# Patient Record
Sex: MALE | Race: WHITE | NOT HISPANIC OR LATINO | Employment: OTHER | URBAN - METROPOLITAN AREA
[De-identification: names, ages, dates, MRNs, and addresses within clinical notes are randomized per-mention and may not be internally consistent; named-entity substitution may affect disease eponyms.]

---

## 2023-05-18 ENCOUNTER — HOSPITAL ENCOUNTER (EMERGENCY)
Facility: CLINIC | Age: 68
Discharge: HOME | End: 2023-05-18
Attending: PHYSICIAN ASSISTANT | Admitting: PHYSICIAN ASSISTANT
Payer: COMMERCIAL

## 2023-05-18 ENCOUNTER — APPOINTMENT (OUTPATIENT)
Dept: GENERAL RADIOLOGY | Facility: CLINIC | Age: 68
End: 2023-05-18
Attending: PHYSICIAN ASSISTANT
Payer: COMMERCIAL

## 2023-05-18 VITALS
WEIGHT: 225 LBS | HEART RATE: 70 BPM | TEMPERATURE: 97.3 F | OXYGEN SATURATION: 100 % | SYSTOLIC BLOOD PRESSURE: 140 MMHG | HEIGHT: 75 IN | RESPIRATION RATE: 20 BRPM | BODY MASS INDEX: 27.98 KG/M2 | DIASTOLIC BLOOD PRESSURE: 88 MMHG

## 2023-05-18 DIAGNOSIS — S76.111A QUADRICEPS TENDON RUPTURE, RIGHT, INITIAL ENCOUNTER: ICD-10-CM

## 2023-05-18 PROCEDURE — 73562 X-RAY EXAM OF KNEE 3: CPT | Mod: RT

## 2023-05-18 PROCEDURE — 73552 X-RAY EXAM OF FEMUR 2/>: CPT | Mod: RT

## 2023-05-18 PROCEDURE — 99284 EMERGENCY DEPT VISIT MOD MDM: CPT

## 2023-05-18 ASSESSMENT — ACTIVITIES OF DAILY LIVING (ADL)
ADLS_ACUITY_SCORE: 35
ADLS_ACUITY_SCORE: 35

## 2023-05-18 ASSESSMENT — ENCOUNTER SYMPTOMS
ARTHRALGIAS: 1
NUMBNESS: 0

## 2023-05-18 NOTE — ED TRIAGE NOTES
Pt was on wing of plane when he slid off, landing on R knee, thigh. Approx fall 2 feet. Unable to bear weight. Did not hit head.      Triage Assessment     Row Name 05/18/23 0670       Triage Assessment (Adult)    Airway WDL WDL       Respiratory WDL    Respiratory WDL WDL       Skin Circulation/Temperature WDL    Skin Circulation/Temperature WDL WDL       Cardiac WDL    Cardiac WDL WDL       Peripheral/Neurovascular WDL    Peripheral Neurovascular WDL WDL       Cognitive/Neuro/Behavioral WDL    Cognitive/Neuro/Behavioral WDL WDL

## 2023-05-18 NOTE — ED PROVIDER NOTES
"  History     Chief Complaint:  Fall and Leg Pain       HPI   Brandon Dye is a 67 year old male with a history of hyperlipidemia who presents with leg pain after a fall. 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 He was cleaning when he slipped and fell. He states that his lower right leg went behind him as he slid off the wing. He then hit the concrete after about a 2 foot drop on his right knee. He had pain in the right upper anterior leg right after. He was able to stand up but not walk as he cannot extend his knee.  No other injuries he denies any numbness in the lower leg or foot.      Independent Historian:   None - Patient Only    Review of External Notes: None    ROS:  Review of Systems   Musculoskeletal: Positive for arthralgias.   Neurological: Negative for numbness.   All other systems reviewed and are negative.      Allergies:  No known allergies     Medications:    Crestor    Past Medical History:    Hyperlipidemia    Social History:  The patient presents to the ED alone.    Physical Exam     Patient Vitals for the past 24 hrs:   BP Temp Temp src Pulse Resp SpO2 Height Weight   05/18/23 1629 (!) 140/88 97.3  F (36.3  C) Temporal 70 20 100 % 1.905 m (6' 3\") 102.1 kg (225 lb)        Physical Exam  General: Alert and oriented.    Head: Normocephalic.  External ears and nose normal.    Eyes: Pupils equal and round.  Normal tracking.    Pulmonary/Chest: Effort and rate normal.     MSK: No bony tenderness to the femur, patella, tibia or fibula.  No overt bruising or swelling.  The patient is unable to perform extension at the knee and has tenderness along the quadriceps tendon no tenderness along the patellar tendon.  No focal ttp over lateral process of tibial plateau.  No gross ligamentous laxity.  Normal movement at the hip and ankle.  No pain with axial loading of the hip or passive ROM of the hip.  Compartments soft, no pain w/passive ROM of ankle or great " toe.    SKIN:  Warm and dry with strong DP,PT pulse and normal capillary refill. No rashes or crepitance.    NEURO:  Normal sensation throughout the foot and ankle. Normal strength of plantar and dorsiflexion in ankle and toes.    PSYCH:  Normal affect      Emergency Department Course     Imaging:  XR Femur Right 2 Views   Preliminary Result   IMPRESSION: Mild osteoarthrosis and chondrocalcinosis in the right hip and knee. The femur appears normal. No fracture.         XR Knee Right 3 Views   Preliminary Result   IMPRESSION: Mild tricompartmental osteoarthrosis in the knee. There is no evidence of fracture. Extensive chondrocalcinosis in the medial and lateral menisci. No fracture, effusion, or calcified intra-articular body.            Report per radiology    Emergency Department Course & Assessments:    Assessments:  1640 Obtained the patients history and performed initial exam  1733 Rechecked the patient and updated patient on findings    Independent Interpretation (X-rays, CTs, rhythm strip):  Independently reviewed the x-rays no evidence of fracture or dislocation.  No knee joint effusion seen.    Social Determinants of Health affecting care:   None    Disposition:  The patient was discharged to home.     Impression & Plan      Medical Decision Makin year old male presents with right knee and anterior thigh pain after fall.  Patient history and records reviewed.  Broad differential was considered.  Patient is well appearing with stable vitals.  X-rays were obtained which demonstrates no evidence of fracture or dislocation.  Examination of joint above and below does not suggest referred pain and do not feel radiographs of these joints are indicated at this time.  Neurovascular status is intact distally and no signs of compartment syndrome.    Patient's is unable to perform extension at the knee and has tenderness along the anterior right thigh over the quadriceps tendon.  Suspect quadriceps tendon  tear/rupture.  Attempted to order ultrasound to confirm however unfortunately staff ultrasound and radiology unable to perform today.  Patient placed in knee immobilizer given crutches nonweightbearing will need close orthopedic follow-up in the next several days as I discussed with surgical repair likely indicated within 1 week to avoid long-term disability poor healing.  He expressed understanding of this.  No evidence of occult knee dislocation.  Nothing to suggest occult tibial plateau fracture.  He will call tomorrow to get in to be seen soon as possible and regardless will follow-up in Ortho clinic or Ortho urgent care within 72 hours for further eval.  He expressed understanding of this.  Return precautions given for signs of CMS compromise etc.        Diagnosis:    ICD-10-CM    1. Quadriceps tendon rupture, right, initial encounter  S76.111A     suspected           Scribe Disclosure:  I, Hung Washburn, am serving as a scribe at 4:33 PM on 5/18/2023 to document services personally performed by Garcia Fleming PA-C based on my observations and the provider's statements to me.     5/18/2023   Garcia Fleming PA-C Etten, Clark Ellsworth, PA-C  05/18/23 1844

## 2023-05-18 NOTE — DISCHARGE INSTRUCTIONS
I suspect that you suffered a quadriceps tendon rupture.  This is a tear in the extensor tendon that allows you to straighten your knee out.  You will need close follow-up with orthopedics as this will likely require urgent surgical repair within the next week.  Call tomorrow to schedule appointment with them for further evaluation.  If you are unable to be seen within the next 2 to 3 days I recommend going to [facility] walk-in for further evaluation.    Discharge Instructions  Extremity Injury    You were seen today for an injury to an extremity (arm, hand, leg, or foot). You may have a bruise, strain, or fracture (broken bone).    Generally, every Emergency Department visit should have a follow-up clinic visit with either a primary or a specialty clinic/provider. Please follow-up as instructed by your emergency provider today.  Return to the Emergency Department right away if:  Your pain seems to change or get worse or there is pain in a new area that wasn t evaluated today.  Your extremity becomes pale, cool, blue, or numb or tingling past the injury.  You have more drainage, redness or pain in the area of the cut or abrasion.  You have pain that you cannot control with the medicine recommended or prescribed here, or you have pain that seems too much for your injury.  Your child (who is injured) will not stop crying or is much more fussy than normal.  You have new symptoms or anything that worries you.    What to Expect:  Your swelling and pain may be worse the day after your injury, but should not be severe and should start getting better after that. You should not have new symptoms and your pain should not get worse.  You may start to get a bruise over the injured area or below the injured area (bruising can follow gravity).  Your movement and strength should get better with time.  Some injuries may not show up until after you have left the Emergency Department so it is important to follow-up as  directed.  Your injury may prevent you from working.  Follow-up with your regular provider to get a work release note.  Pain medications or your injury may make it unsafe to drive or operate machinery.    Home Care:  RICE: Rest, Ice, Compression, Elevation  Rest: Rest your injured area for at least 1-2 days. After that you may start using your extremity again as long as there is not too much pain.   Ice: Apply ice your injured area for 15 minutes at a time, at least 3 times a day. Use a cloth between the ice bag and your skin to prevent frostbite. Do not sleep with an ice pack or heating pad on, since this can cause burns or skin injury.  Compression: You may use an elastic bandage (Ace  Wrap) if it makes you more comfortable. Wrap it just tight enough to provide light compression, like a new pair of socks feels. Loosen the bandage if you have swelling past the bandage.  Elevation: Raise the injured area above the level of your heart as much as possible in the first 1-2 days.    Use Tylenol  (acetaminophen), Motrin (ibuprofen), or Advil  (ibuprofen) for your pain unless you have an allergy or are told not to use these medications by your provider.  Take the medications as instructed on the package. Tylenol  (acetaminophen) is in many prescription medicines and non-prescription medicines--check all of your medicines to be sure you aren t taking more than 3000 mg per day.  Please follow any other instructions that were discussed with you by your provider.    Stretching/Exercises:  You may have been provided with instructions for stretching or exercises. If your injury was to your arm or shoulder and your provider put you in a sling or an immobilizer, it is important that you take off your immobilizer within 3 days and stretch/move your shoulder, unless your provider specifically tells you to not move your shoulder.  This is to prevent further injury such as a  frozen shoulder .     If you were given a prescription for  medicine here today, be sure to read all of the information (including the package insert) that comes with your prescription.  This will include important information about the medicine, its side effects, and any warnings that you need to know about.  The pharmacist who fills the prescription can provide more information and answer questions you may have about the medicine.  If you have questions or concerns that the pharmacist cannot address, please call or return to the Emergency Department.     Remember that you can always come back to the Emergency Department if you are not able to see your regular provider in the amount of time listed above, if you get any new symptoms, or if there is anything that worries you.

## 2023-05-23 ENCOUNTER — OFFICE VISIT (OUTPATIENT)
Dept: FAMILY MEDICINE | Facility: CLINIC | Age: 68
End: 2023-05-23

## 2023-05-23 VITALS
DIASTOLIC BLOOD PRESSURE: 86 MMHG | WEIGHT: 225 LBS | SYSTOLIC BLOOD PRESSURE: 132 MMHG | HEIGHT: 75 IN | HEART RATE: 75 BPM | TEMPERATURE: 97.8 F | OXYGEN SATURATION: 98 % | BODY MASS INDEX: 27.98 KG/M2

## 2023-05-23 DIAGNOSIS — S76.111A QUADRICEPS TENDON RUPTURE, RIGHT, INITIAL ENCOUNTER: Primary | ICD-10-CM

## 2023-05-23 DIAGNOSIS — Z76.89 HEALTH CARE HOME: ICD-10-CM

## 2023-05-23 DIAGNOSIS — Z71.89 ACP (ADVANCE CARE PLANNING): ICD-10-CM

## 2023-05-23 DIAGNOSIS — Z01.818 PRE-OP EXAM: ICD-10-CM

## 2023-05-23 LAB
% GRANULOCYTES: 54.4 %
BUN SERPL-MCNC: 23 MG/DL (ref 7–25)
BUN/CREATININE RATIO: 22.5 (ref 6–32)
CALCIUM SERPL-MCNC: 9.5 MG/DL (ref 8.6–10.3)
CHLORIDE SERPLBLD-SCNC: 104.5 MMOL/L (ref 98–110)
CO2 SERPL-SCNC: 27.7 MMOL/L (ref 20–32)
CREAT SERPL-MCNC: 1.02 MG/DL (ref 0.6–1.3)
GLUCOSE SERPL-MCNC: 90 MG/DL (ref 60–99)
HCT VFR BLD AUTO: 44.6 % (ref 40–53)
HEMOGLOBIN: 14.3 G/DL (ref 13.3–17.7)
LYMPHOCYTES NFR BLD AUTO: 37.3 %
MCH RBC QN AUTO: 30.2 PG (ref 26–33)
MCHC RBC AUTO-ENTMCNC: 32.1 G/DL (ref 31–36)
MCV RBC AUTO: 94 FL (ref 78–100)
MONOCYTES NFR BLD AUTO: 8.3 %
PLATELET COUNT - QUEST: 191 10^9/L (ref 150–375)
POTASSIUM SERPL-SCNC: 4.37 MMOL/L (ref 3.5–5.3)
RBC # BLD AUTO: 4.74 10*12/L (ref 4.4–5.9)
SODIUM SERPL-SCNC: 139 MMOL/L (ref 135–146)
WBC # BLD AUTO: 8.7 10*9/L (ref 4–11)

## 2023-05-23 PROCEDURE — 85025 COMPLETE CBC W/AUTO DIFF WBC: CPT | Performed by: PHYSICIAN ASSISTANT

## 2023-05-23 PROCEDURE — 80048 BASIC METABOLIC PNL TOTAL CA: CPT | Performed by: PHYSICIAN ASSISTANT

## 2023-05-23 PROCEDURE — 99204 OFFICE O/P NEW MOD 45 MIN: CPT | Performed by: PHYSICIAN ASSISTANT

## 2023-05-23 PROCEDURE — 36415 COLL VENOUS BLD VENIPUNCTURE: CPT | Performed by: PHYSICIAN ASSISTANT

## 2023-05-23 PROCEDURE — 93000 ELECTROCARDIOGRAM COMPLETE: CPT | Performed by: PHYSICIAN ASSISTANT

## 2023-05-23 NOTE — PROGRESS NOTES
[facility]  [address] [place] [address]  [address]  [facility] [address]  Phone: [phone]  Fax: [phone]  Primary Provider: Nikunj Verduzco  Pre-op Performing Provider: NIKUNJ VERDUZCO      PREOPERATIVE EVALUATION:  Today's date: 2023    Brandon Dye is a 67 year old male who presents for a preoperative evaluation. ( [birth year])    Surgical Information:  Surgery/Procedure: Right knee repair  Surgery Location: [facility] [place]   Surgeon: Dr. Rodrigez  Surgery Date: 23  Time of Surgery: PM  Where patient plans to recover: At home with family  Fax number for surgical facility: [phone]    Assessment & Plan     The proposed surgical procedure is considered INTERMEDIATE risk.    Health Care Home      ACP (advance care planning)      Quadriceps tendon rupture, right, initial encounter  Proceed with surgery at surgeon's discretion.    - VENOUS COLLECTION  - HEMOGRAM PLATELET DIFF (BFP)  - Basic Metabolic Panel (BFP)  - EKG 12-lead complete w/read - Clinics    Pre-op exam    - VENOUS COLLECTION  - HEMOGRAM PLATELET DIFF (BFP)  - Basic Metabolic Panel (BFP)  - EKG 12-lead complete w/read - Clinics             - No identified additional risk factors other than previously addressed    Antiplatelet or Anticoagulation Medication Instructions:   - Patient is on no antiplatelet or anticoagulation medications.    Additional Medication Instructions:  Patient is on no additional chronic medications    RECOMMENDATION:  APPROVAL GIVEN to proceed with proposed procedure, without further diagnostic evaluation.      Subjective       HPI related to upcoming procedure: Fell off personal airplane onto concrete    1. No - Have you ever had a heart attack or stroke?  2. No - Have you ever had surgery on your heart or blood vessels, such as a stent, coronary (heart) bypass, or surgery on an artery in the head, neck, heart, or legs?  3. No - Do you have chest pain when you are physically active?  4. No - Do  you have a history of heart failure?  5. No - Do you currently have a cold, bronchitis, or symptoms of other respiratory (head and chest) infections?  6. No - Do you have a cough, shortness of breath, or wheezing?  7. No - Do you or anyone in your family have a history of blood clots?  8. No - Do you or anyone in your family have a serious bleeding problem, such as long-lasting bleeding after surgeries or cuts?  9. No - Have you ever had anemia or been told to take iron pills?  10. No - Have you had any abnormal blood loss such as black, tarry or bloody stools, or abnormal vaginal bleeding?  11. No - Have you ever had a blood transfusion?  12. Yes - Are you willing to have a blood transfusion if it is medically needed before, during, or after your surgery?  13. No - Have you or anyone in your family ever had problems with anesthesia (sedation for surgery)?  14. No - Do you have sleep apnea, excessive snoring, or daytime drowsiness?   15. No - Do you have any artifical heart valves or other implanted medical devices, such as a pacemaker, defibrillator, or continuous glucose monitor?  16. No - Do you have any artifical joints?  17. No - Are you allergic to latex?    Health Care Directive:  Patient does not have a Health Care Directive or Living Will: Discussed advance care planning with patient; information given to patient to review.    Preoperative Review of :   reviewed - no record of controlled substances prescribed.      Status of Chronic Conditions:  See problem list for active medical problems.  Problems all longstanding and stable, except as noted/documented.  See ROS for pertinent symptoms related to these conditions.      Review of Systems  CONSTITUTIONAL: NEGATIVE for fever, chills, change in weight  INTEGUMENTARY/SKIN: NEGATIVE for worrisome rashes, moles or lesions  EYES: NEGATIVE for vision changes or irritation  ENT/MOUTH: NEGATIVE for ear, mouth and throat problems  RESP: NEGATIVE for significant  "cough or SOB  CV: NEGATIVE for chest pain, palpitations or peripheral edema  GI: NEGATIVE for nausea, abdominal pain, heartburn, or change in bowel habits  : NEGATIVE for frequency, dysuria, or hematuria  NEURO: NEGATIVE for weakness, dizziness or paresthesias  ENDOCRINE: NEGATIVE for temperature intolerance, skin/hair changes  HEME: NEGATIVE for bleeding problems  PSYCHIATRIC: NEGATIVE for changes in mood or affect    Patient Active Problem List    Diagnosis Date Noted     Health Care Home 05/23/2023     Priority: Low     ACP (advance care planning) 05/23/2023     Priority: Low      History reviewed. No pertinent past medical history.  History reviewed. No pertinent surgical history.  No current outpatient medications on file.       No Known Allergies     Social History     Tobacco Use     Smoking status: Never     Passive exposure: Never     Smokeless tobacco: Never   Vaping Use     Vaping status: Not on file   Substance Use Topics     Alcohol use: Yes     Alcohol/week: 1.0 standard drink of alcohol     Types: 1 Standard drinks or equivalent per week       History   Drug Use Unknown         Objective     /86 (BP Location: Right arm, Patient Position: Sitting, Cuff Size: Adult Large)   Pulse 75   Temp 97.8  F (36.6  C) (Temporal)   Ht 1.905 m (6' 3\")   Wt 102.1 kg (225 lb)   SpO2 98%   BMI 28.12 kg/m      Physical Exam    GENERAL APPEARANCE: healthy, alert and no distress     EYES: EOMI,  PERRL     HENT: ear canals and TM's normal and nose and mouth without ulcers or lesions     NECK: no adenopathy, no asymmetry, masses, or scars and thyroid normal to palpation     RESP: lungs clear to auscultation - no rales, rhonchi or wheezes     CV: regular rates and rhythm, normal S1 S2, no S3 or S4 and no murmur, click or rub     ABDOMEN:  soft, nontender, no HSM or masses and bowel sounds normal     SKIN: no suspicious lesions or rashes     NEURO: Normal strength and tone, sensory exam grossly normal, mentation " intact and speech normal     PSYCH: mentation appears normal. and affect normal/bright     LYMPHATICS: No cervical adenopathy    No results for input(s): HGB, PLT, INR, NA, POTASSIUM, CR, A1C in the last 91267 hours.     Diagnostics:  Recent Results (from the past 24 hour(s))   HEMOGRAM PLATELET DIFF (BFP)    Collection Time: 05/23/23 12:00 AM   Result Value Ref Range    WBC 8.7 4.0 - 11 10*9/L    RBC Count 4.74 4.4 - 5.9 10*12/L    Hemoglobin 14.3 13.3 - 17.7 g/dL    Hematocrit 44.6 40.0 - 53.0 %    MCV 94.0 78 - 100 fL    MCH 30.2 26 - 33 pg    MCHC 32.1 31 - 36 g/dL    Platelet Count 191 150 - 375 10^9/L    % Granulocytes 54.4 %    % Lymphocytes 37.3 %    % Monocytes 8.3 %   Basic Metabolic Panel (BFP)    Collection Time: 05/23/23  2:20 PM   Result Value Ref Range    Carbon Dioxide 27.7 20 - 32 mmol/L    Creatinine 1.02 0.60 - 1.30 mg/dL    Glucose 90 60 - 99 mg/dL    Sodium 139.0 135 - 146 mmol/L    Potassium 4.37 3.5 - 5.3 mmol/L    Chloride 104.5 98 - 110 mmol/L    Urea Nitrogen 23 7 - 25 mg/dL    Calcium 9.5 8.6 - 10.3 mg/dL    BUN/Creatinine Ratio 22.5 6 - 32      EKG: appears normal, NSR, normal axis, normal intervals, no acute ST/T changes c/w ischemia, no LVH by voltage criteria, unchanged from previous tracings    Revised Cardiac Risk Index (RCRI):  The patient has the following serious cardiovascular risks for perioperative complications:   - Coronary Artery Disease = 1 point   -Known mild CAD per cardiology    RCRI Interpretation: 1 point: Class II (low risk - 0.9% complication rate)           Signed Electronically by: Nikunj Verduzco PA-C  Copy of this evaluation report is provided to requesting physician.

## 2023-05-23 NOTE — PROGRESS NOTES
[facility]  [address]  [address]  [facility] [address]  Phone: [phone]  Fax: [phone]  Primary Provider: Nikunj Verduzco  Pre-op Performing Provider: NIKUNJ VERDUZCO    {Provider  Link to PREOP SmartSet  Use this to apply standard patient instructions to AVS; includes medication directions, common orders, guidelines for anemia, warfarin, additional testing   :611524}  PREOPERATIVE EVALUATION:  Today's date: 5/23/2023    Brandon Dye is a 67 year old male who presents for a preoperative evaluation.    Surgical Information:  Surgery/Procedure: ***  Surgery Location: ***  Surgeon: ***  Surgery Date: ***  Time of Surgery: ***  Where patient plans to recover: {Preop post recovery plans :594134}  Fax number for surgical facility: {SURGERY FAX NUMBER:[phone]}    {2021 Provider Charting Preference for Preop :118082}    Subjective       HPI related to upcoming procedure: ***    {PREOP QUESTIONNAIRE OPTIONS 2 (BY MA):013517}    {Click here to pull in Questionnaire Data after Qnr completed :145842}  Health Care Directive:  Patient does not have a Health Care Directive or Living Will: {ADVANCE_DIRECTIVE_STATUS:176096}    Preoperative Review of :  {Mnpmpreport:521975}  {Review MNPMP for all patients per ICSI MNPMP Profile:621366}    {Chronic problem details (Optional) :225499}    Review of Systems  {ROS Preop Choices:629552}    There are no problems to display for this patient.     No past medical history on file.  No past surgical history on file.  No current outpatient medications on file.       No Known Allergies     Social History     Tobacco Use     Smoking status: Not on file     Smokeless tobacco: Not on file   Substance Use Topics     Alcohol use: Not on file     {FAMILY HISTORY (Optional):346997661}  History   Drug Use Not on file         Objective     There were no vitals taken for this visit.    Physical Exam  {EXAM Preop Choices:606295}    No results for input(s): HGB, PLT, INR,  "NA, POTASSIUM, CR, A1C in the last 05750 hours.     Diagnostics:  {LABS:758796}   {EK}    Revised Cardiac Risk Index (RCRI):  The patient has the following serious cardiovascular risks for perioperative complications:  {PREOP REVISED CARDIAC RISK INDEX (RCRI) :225095::\" - No serious cardiac risks = 0 points\"}     RCRI Interpretation: {REVISED CARDIAC RISK INTERPRETATION :072838}         Signed Electronically by: Nikunj Verduzco PA-C  Copy of this evaluation report is provided to requesting physician.    {Provider Resources  Preop [facility] Preop Guidelines  Revised Cardiac Risk Index :481681}  "

## 2023-05-23 NOTE — NURSING NOTE
Chief Complaint   Patient presents with     Pre-Op Exam     Surgery/Procedure: Right knee repair  Surgery Location: [facility]   Surgeon: Dr. Rodrigez  Surgery Date: 05/24/23     New Patient     New patient to our clinic

## 2023-06-14 ENCOUNTER — DOCUMENTATION ONLY (OUTPATIENT)
Facility: CLINIC | Age: 68
End: 2023-06-14
Payer: COMMERCIAL

## 2023-06-14 DIAGNOSIS — S76.111A STRAIN OF QUADRICEPS TENDON, RIGHT, INITIAL ENCOUNTER: Primary | ICD-10-CM

## 2023-06-16 ENCOUNTER — HOSPITAL ENCOUNTER (INPATIENT)
Facility: CLINIC | Age: 68
LOS: 5 days | Discharge: HOME | DRG: 378 | End: 2023-06-21
Attending: EMERGENCY MEDICINE | Admitting: INTERNAL MEDICINE
Payer: COMMERCIAL

## 2023-06-16 DIAGNOSIS — K92.2 UPPER GI BLEED: ICD-10-CM

## 2023-06-16 DIAGNOSIS — D64.9 SYMPTOMATIC ANEMIA: ICD-10-CM

## 2023-06-16 LAB
ABO/RH(D): NORMAL
ANION GAP SERPL CALCULATED.3IONS-SCNC: 9 MMOL/L (ref 7–15)
ANTIBODY SCREEN: NEGATIVE
ATRIAL RATE - MUSE: 88 BPM
BASOPHILS # BLD AUTO: 0 10E3/UL (ref 0–0.2)
BASOPHILS NFR BLD AUTO: 0 %
BLD PROD TYP BPU: NORMAL
BLOOD COMPONENT TYPE: NORMAL
BUN SERPL-MCNC: 26.7 MG/DL (ref 8–23)
CALCIUM SERPL-MCNC: 8.8 MG/DL (ref 8.8–10.2)
CHLORIDE SERPL-SCNC: 103 MMOL/L (ref 98–107)
CODING SYSTEM: NORMAL
CREAT SERPL-MCNC: 0.95 MG/DL (ref 0.67–1.17)
CROSSMATCH: NORMAL
D DIMER PPP FEU-MCNC: <0.27 UG/ML FEU (ref 0–0.5)
DEPRECATED HCO3 PLAS-SCNC: 24 MMOL/L (ref 22–29)
DIASTOLIC BLOOD PRESSURE - MUSE: NORMAL MMHG
EOSINOPHIL # BLD AUTO: 0.2 10E3/UL (ref 0–0.7)
EOSINOPHIL NFR BLD AUTO: 2 %
ERYTHROCYTE [DISTWIDTH] IN BLOOD BY AUTOMATED COUNT: 15.1 % (ref 10–15)
GFR SERPL CREATININE-BSD FRML MDRD: 87 ML/MIN/1.73M2
GLUCOSE SERPL-MCNC: 133 MG/DL (ref 70–99)
HCT VFR BLD AUTO: 22.1 % (ref 40–53)
HGB BLD-MCNC: 7.2 G/DL (ref 13.3–17.7)
HGB BLD-MCNC: 7.3 G/DL (ref 13.3–17.7)
IMM GRANULOCYTES # BLD: 0 10E3/UL
IMM GRANULOCYTES NFR BLD: 0 %
INR PPP: 1.1 (ref 0.85–1.15)
INTERPRETATION ECG - MUSE: NORMAL
ISSUE DATE AND TIME: NORMAL
LYMPHOCYTES # BLD AUTO: 2.7 10E3/UL (ref 0.8–5.3)
LYMPHOCYTES NFR BLD AUTO: 36 %
MCH RBC QN AUTO: 30.7 PG (ref 26.5–33)
MCHC RBC AUTO-ENTMCNC: 33 G/DL (ref 31.5–36.5)
MCV RBC AUTO: 93 FL (ref 78–100)
MONOCYTES # BLD AUTO: 0.4 10E3/UL (ref 0–1.3)
MONOCYTES NFR BLD AUTO: 6 %
NEUTROPHILS # BLD AUTO: 4.2 10E3/UL (ref 1.6–8.3)
NEUTROPHILS NFR BLD AUTO: 56 %
NRBC # BLD AUTO: 0 10E3/UL
NRBC BLD AUTO-RTO: 0 /100
NT-PROBNP SERPL-MCNC: 99 PG/ML (ref 0–900)
P AXIS - MUSE: 64 DEGREES
PLATELET # BLD AUTO: 198 10E3/UL (ref 150–450)
POTASSIUM SERPL-SCNC: 3.8 MMOL/L (ref 3.4–5.3)
PR INTERVAL - MUSE: 132 MS
QRS DURATION - MUSE: 84 MS
QT - MUSE: 370 MS
QTC - MUSE: 447 MS
R AXIS - MUSE: 56 DEGREES
RBC # BLD AUTO: 2.38 10E6/UL (ref 4.4–5.9)
SODIUM SERPL-SCNC: 136 MMOL/L (ref 136–145)
SPECIMEN EXPIRATION DATE: NORMAL
SYSTOLIC BLOOD PRESSURE - MUSE: NORMAL MMHG
T AXIS - MUSE: 43 DEGREES
TROPONIN T SERPL HS-MCNC: 27 NG/L
TROPONIN T SERPL HS-MCNC: 32 NG/L
UNIT ABO/RH: NORMAL
UNIT NUMBER: NORMAL
UNIT STATUS: NORMAL
UNIT TYPE ISBT: 9500
VENTRICULAR RATE- MUSE: 88 BPM
WBC # BLD AUTO: 7.5 10E3/UL (ref 4–11)

## 2023-06-16 PROCEDURE — 250N000011 HC RX IP 250 OP 636: Performed by: EMERGENCY MEDICINE

## 2023-06-16 PROCEDURE — 85025 COMPLETE CBC W/AUTO DIFF WBC: CPT | Performed by: EMERGENCY MEDICINE

## 2023-06-16 PROCEDURE — 85379 FIBRIN DEGRADATION QUANT: CPT | Performed by: EMERGENCY MEDICINE

## 2023-06-16 PROCEDURE — 80048 BASIC METABOLIC PNL TOTAL CA: CPT | Performed by: EMERGENCY MEDICINE

## 2023-06-16 PROCEDURE — 84484 ASSAY OF TROPONIN QUANT: CPT | Performed by: INTERNAL MEDICINE

## 2023-06-16 PROCEDURE — 250N000011 HC RX IP 250 OP 636: Performed by: INTERNAL MEDICINE

## 2023-06-16 PROCEDURE — 85018 HEMOGLOBIN: CPT | Performed by: INTERNAL MEDICINE

## 2023-06-16 PROCEDURE — C9113 INJ PANTOPRAZOLE SODIUM, VIA: HCPCS | Performed by: EMERGENCY MEDICINE

## 2023-06-16 PROCEDURE — 36415 COLL VENOUS BLD VENIPUNCTURE: CPT | Performed by: EMERGENCY MEDICINE

## 2023-06-16 PROCEDURE — 84484 ASSAY OF TROPONIN QUANT: CPT | Performed by: EMERGENCY MEDICINE

## 2023-06-16 PROCEDURE — 36430 TRANSFUSION BLD/BLD COMPNT: CPT

## 2023-06-16 PROCEDURE — 258N000003 HC RX IP 258 OP 636: Performed by: EMERGENCY MEDICINE

## 2023-06-16 PROCEDURE — 86901 BLOOD TYPING SEROLOGIC RH(D): CPT | Performed by: EMERGENCY MEDICINE

## 2023-06-16 PROCEDURE — 96376 TX/PRO/DX INJ SAME DRUG ADON: CPT

## 2023-06-16 PROCEDURE — 93005 ELECTROCARDIOGRAM TRACING: CPT

## 2023-06-16 PROCEDURE — 258N000003 HC RX IP 258 OP 636: Performed by: INTERNAL MEDICINE

## 2023-06-16 PROCEDURE — 99223 1ST HOSP IP/OBS HIGH 75: CPT | Performed by: INTERNAL MEDICINE

## 2023-06-16 PROCEDURE — 86923 COMPATIBILITY TEST ELECTRIC: CPT | Performed by: HOSPITALIST

## 2023-06-16 PROCEDURE — 36415 COLL VENOUS BLD VENIPUNCTURE: CPT | Performed by: INTERNAL MEDICINE

## 2023-06-16 PROCEDURE — 86923 COMPATIBILITY TEST ELECTRIC: CPT | Performed by: INTERNAL MEDICINE

## 2023-06-16 PROCEDURE — 85610 PROTHROMBIN TIME: CPT | Performed by: EMERGENCY MEDICINE

## 2023-06-16 PROCEDURE — 86850 RBC ANTIBODY SCREEN: CPT | Performed by: EMERGENCY MEDICINE

## 2023-06-16 PROCEDURE — 96374 THER/PROPH/DIAG INJ IV PUSH: CPT

## 2023-06-16 PROCEDURE — 120N000001 HC R&B MED SURG/OB

## 2023-06-16 PROCEDURE — 99285 EMERGENCY DEPT VISIT HI MDM: CPT | Mod: 25

## 2023-06-16 PROCEDURE — 83880 ASSAY OF NATRIURETIC PEPTIDE: CPT | Performed by: EMERGENCY MEDICINE

## 2023-06-16 PROCEDURE — C9113 INJ PANTOPRAZOLE SODIUM, VIA: HCPCS | Performed by: INTERNAL MEDICINE

## 2023-06-16 PROCEDURE — P9016 RBC LEUKOCYTES REDUCED: HCPCS | Performed by: EMERGENCY MEDICINE

## 2023-06-16 PROCEDURE — 86923 COMPATIBILITY TEST ELECTRIC: CPT | Performed by: EMERGENCY MEDICINE

## 2023-06-16 RX ORDER — ASPIRIN 81 MG/1
81 TABLET ORAL EVERY 6 HOURS PRN
Status: ON HOLD | COMMUNITY
End: 2023-06-21

## 2023-06-16 RX ORDER — ACETAMINOPHEN 650 MG/1
650 SUPPOSITORY RECTAL EVERY 6 HOURS PRN
Status: DISCONTINUED | OUTPATIENT
Start: 2023-06-16 | End: 2023-06-19

## 2023-06-16 RX ORDER — ACETAMINOPHEN 325 MG/1
325-650 TABLET ORAL EVERY 6 HOURS PRN
COMMUNITY

## 2023-06-16 RX ORDER — ACETAMINOPHEN 325 MG/1
650 TABLET ORAL EVERY 6 HOURS PRN
Status: DISCONTINUED | OUTPATIENT
Start: 2023-06-16 | End: 2023-06-19

## 2023-06-16 RX ORDER — ONDANSETRON 4 MG/1
4 TABLET, ORALLY DISINTEGRATING ORAL EVERY 6 HOURS PRN
Status: DISCONTINUED | OUTPATIENT
Start: 2023-06-16 | End: 2023-06-21 | Stop reason: HOSPADM

## 2023-06-16 RX ORDER — SODIUM CHLORIDE 9 MG/ML
INJECTION, SOLUTION INTRAVENOUS CONTINUOUS
Status: DISCONTINUED | OUTPATIENT
Start: 2023-06-16 | End: 2023-06-18

## 2023-06-16 RX ORDER — ONDANSETRON 2 MG/ML
4 INJECTION INTRAMUSCULAR; INTRAVENOUS EVERY 6 HOURS PRN
Status: DISCONTINUED | OUTPATIENT
Start: 2023-06-16 | End: 2023-06-21 | Stop reason: HOSPADM

## 2023-06-16 RX ORDER — IBUPROFEN 200 MG
200 TABLET ORAL EVERY 4 HOURS PRN
Status: ON HOLD | COMMUNITY
End: 2023-06-21

## 2023-06-16 RX ADMIN — SODIUM CHLORIDE 100 ML/HR: 9 INJECTION, SOLUTION INTRAVENOUS at 17:40

## 2023-06-16 RX ADMIN — PANTOPRAZOLE SODIUM 80 MG: 40 INJECTION, POWDER, FOR SOLUTION INTRAVENOUS at 13:18

## 2023-06-16 RX ADMIN — SODIUM CHLORIDE 8 MG/HR: 9 INJECTION, SOLUTION INTRAVENOUS at 13:34

## 2023-06-16 RX ADMIN — PANTOPRAZOLE SODIUM 40 MG: 40 INJECTION, POWDER, FOR SOLUTION INTRAVENOUS at 20:55

## 2023-06-16 ASSESSMENT — ACTIVITIES OF DAILY LIVING (ADL)
DIFFICULTY_EATING/SWALLOWING: NO
DOING_ERRANDS_INDEPENDENTLY_DIFFICULTY: NO
NUMBER_OF_TIMES_PATIENT_HAS_FALLEN_WITHIN_LAST_SIX_MONTHS: 1
ADLS_ACUITY_SCORE: 22
ADLS_ACUITY_SCORE: 22
TOILETING_ISSUES: NO
CHANGE_IN_FUNCTIONAL_STATUS_SINCE_ONSET_OF_CURRENT_ILLNESS/INJURY: NO
ADLS_ACUITY_SCORE: 35
FALL_HISTORY_WITHIN_LAST_SIX_MONTHS: YES
ADLS_ACUITY_SCORE: 20
EQUIPMENT_CURRENTLY_USED_AT_HOME: CRUTCHES
ADLS_ACUITY_SCORE: 35
ADLS_ACUITY_SCORE: 35
WEAR_GLASSES_OR_BLIND: YES
CONCENTRATING,_REMEMBERING_OR_MAKING_DECISIONS_DIFFICULTY: NO
DRESSING/BATHING_DIFFICULTY: NO
WALKING_OR_CLIMBING_STAIRS_DIFFICULTY: NO

## 2023-06-16 NOTE — PHARMACY-ADMISSION MEDICATION HISTORY
Pharmacist Admission Medication History    Admission medication history is complete. The information provided in this note is only as accurate as the sources available at the time of the update.    Medication reconciliation/reorder completed by provider prior to medication history? No    Information Source(s): Patient and CareEverywhere/SureScripts via in-person    Pertinent Information: Recently completed ASA 325mg daily for post op ppx and started the baby aspirin    Changes made to PTA medication list:    Added: all meds    Deleted: None    Changed: None    Allergies reviewed with patient and updates made in EHR: yes    Medication History Completed By: Trista Blue RPH 6/16/2023 1:45 PM    Prior to Admission medications    Medication Sig Last Dose Taking? Auth Provider Long Term End Date   acetaminophen (TYLENOL) 325 MG tablet Take 325-650 mg by mouth every 6 hours as needed for mild pain 6/15/2023 at x2 Yes Unknown, Entered By History     aspirin 81 MG EC tablet Take 81 mg by mouth every 6 hours as needed for moderate pain 6/16/2023 at x2 Yes Unknown, Entered By History     ibuprofen (ADVIL/MOTRIN) 200 MG tablet Take 200 mg by mouth every 4 hours as needed for pain  Yes Unknown, Entered By History

## 2023-06-16 NOTE — CONSULTS
[facility]    Gastroenterology Consultation    Date of Admission:  6/16/2023    History of Present Illness   Brandon Dye is a 67 year old male who presents with melena.   He reports he started noticing dark stools around at least a week ago.  He has gradually felt worse since then and as he was feeling quite unwell when up and appearing more pale he presented for evaluation today.  He was noted to have a hgb near 7.  He denies history of GI bleeds.  He has been on ASA and ibuprofen since quad tendon surgery late May.  He also has noticed increase GI upset/reflux type complaints past couple weeks.  Denies any bright read blood or emesis.  Denies prior ulcer hx.  He has had prior colonoscopies with last 4-5 years ago which he states was negative for any major problems (gets them in [place]). Never had endoscopy done before.    Assessment & Plan   Brandon Dye is a 67 year old male who was admitted on 6/16/2023. I was asked to see the patient for melena. w/ recent joint surgery on ASA and NSAID p/w melena w/ acute drop in Hgb compared to 5/2023.   -w/ clinical hx and elevated BUN, upper GI bleeding from PUD until proven otherwise  -IV PPI 80 mg loading followed by 40 mg BID  -NPO after MN, EGD tmr or earlier if vitals instability    78[address] level 2 consult    Principal Problem:    Symptomatic anemia  Active Problems:    Upper GI bleed      Cheyenne Braswell MD  (Man[place])  [facility]  Office: [phone]  Cell: [phone], please feel free to call the cell    Code Status    Full Code      Primary Care Physician   Nikunj Braswell MD  (Manolo)  [facility]  Office: [phone]  Cell: [phone], please feel free to call the cell      Past Medical History   I have reviewed this patient's medical history and updated it with pertinent information if needed.   No past medical history on file.    Past Surgical History   I  have reviewed this patient's surgical history and updated it with pertinent information if needed.  Past Surgical History:   Procedure Laterality Date     COLONOSCOPY N/A 6/18/2023    Procedure: Colonoscopy;  Surgeon: Cheyenne Braswell MD;  Location:  GI     ESOPHAGOSCOPY, GASTROSCOPY, DUODENOSCOPY (EGD), COMBINED N/A 6/17/2023    Procedure: Esophagoscopy, gastroscopy, duodenoscopy (EGD), combined;  Surgeon: Cheyenne Braswell MD;  Location:  GI       Prior to Admission Medications   Prior to Admission Medications   Prescriptions Last Dose Informant Patient Reported? Taking?   acetaminophen (TYLENOL) 325 MG tablet 6/15/2023 at x2 Self Yes Yes   Sig: Take 325-650 mg by mouth every 6 hours as needed for mild pain   aspirin 81 MG EC tablet 6/16/2023 at x2 Self Yes Yes   Sig: Take 81 mg by mouth every 6 hours as needed for moderate pain   ibuprofen (ADVIL/MOTRIN) 200 MG tablet  Self Yes Yes   Sig: Take 200 mg by mouth every 4 hours as needed for pain      Facility-Administered Medications: None     Allergies   No Known Allergies    Social History   I have reviewed this patient's social history and updated it with pertinent information if needed. Brandon Dye  reports that he has never smoked. He has never been exposed to tobacco smoke. He has never used smokeless tobacco. He reports current alcohol use of about 1.0 standard drink of alcohol per week. He reports that he does not use drugs.    Family History   I have reviewed this patient's family history and updated it with pertinent information if needed.   No family history on file.    Review of Systems   The 10 point Review of Systems is negative other than noted in the HPI or here.     Physical Exam   Temp: 98.2  F (36.8  C) Temp src: Oral BP: 125/75 Pulse: 78   Resp: 10 SpO2: 99 % O2 Device: None (Room air)    Vital Signs with Ranges  Temp:  [97.8  F (36.6  C)-98.5  F (36.9  C)] 98.2  F (36.8  C)  Pulse:  [69-91] 78  Resp:  [9-31] 10  BP: (105-138)/(68-87)  125/75  SpO2:  [96 %-100 %] 99 %  225 lbs 0 oz    Exam:  Constitutional: healthy, alert and no distress  Head: Normocephalic. No masses, lesions, tenderness or abnormalities  Neck: Neck supple. No adenopathy. Thyroid symmetric, normal size,, Carotids without bruits.  ENT: ENT exam normal, no neck nodes or sinus tenderness  Cardiovascular: negative, PMI normal. No lifts, heaves, or thrills. RRR. No murmurs, clicks gallops or rub  Respiratory: negative, Percussion normal. Good diaphragmatic excursion. Lungs clear  Gastrointestinal: Abdomen soft, non-tender. BS normal. No masses, organomegaly  : Deferred  Musculoskeletal: extremities normal- no gross deformities noted, gait normal and normal muscle tone  Skin: no suspicious lesions or rashes  Neurologic: Gait normal. Reflexes normal and symmetric. Sensation grossly WNL.  Psychiatric: mentation appears normal and affect normal/bright  Hematologic/Lymphatic/Immunologic: Normal cervical lymph nodes     Data   Results for orders placed or performed during the hospital encounter of 06/16/23 (from the past 24 hour(s))   CBC with platelets   Result Value Ref Range    WBC Count 7.8 4.0 - 11.0 10e3/uL    RBC Count 2.46 (L) 4.40 - 5.90 10e6/uL    Hemoglobin 7.5 (L) 13.3 - 17.7 g/dL    Hematocrit 22.8 (L) 40.0 - 53.0 %    MCV 93 78 - 100 fL    MCH 30.5 26.5 - 33.0 pg    MCHC 32.9 31.5 - 36.5 g/dL    RDW 15.9 (H) 10.0 - 15.0 %    Platelet Count 157 150 - 450 10e3/uL   Hemoglobin   Result Value Ref Range    Hemoglobin 7.4 (L) 13.3 - 17.7 g/dL   COLONOSCOPY   Result Value Ref Range    COLONOSCOPY       [facility]  [address]  [id number]  _______________________________________________________________________________  Patient Name: Brandon Dye           Procedure Date: 6/18/2023 8:47 AM  MRN: [id number]                       Account Number: [id number]  YOB: 1955              Admit Type: Inpatient  Age: 67                                Room: 2  Note Status: Finalized                Attending MD: BILLY NORWOOD MD,   Instrument Name: 524 [id number] Adult Colon   _______________________________________________________________________________     Procedure:                Colonoscopy  Indications:              Melena, Acute post hemorrhagic anemia  Providers:                BILLY NORWOOD MD, Arlyn Howe, YU  Referring MD:               Medicines:                Midazolam 4 mg IV, Fentanyl 150 micrograms IV  Complications:            No immediate complications.  _______________________________________________________________________________   Procedure:                Pre-Anesthesia Assessment:                            - Prior to the procedure, a History and Physical                             was performed, and patient medications and                             allergies were reviewed. The patient is competent.                             The risks and benefits of the procedure and the                             sedation options and risks were discussed with the                             patient. All questions were answered and informed                             consent was obtained. Patient identification and                             proposed procedure were verified by the physician.                             Mental Status Examination: normal. Respiratory                             Examination: clear to auscultation. Prophylactic                             Antibiotics: The patient does not require                             prophylactic antibiotics. Prior Anticoagulants: The                             pa tient has taken no anticoagulant or antiplatelet                             agents. ASA Grade Assessment: I - A normal, healthy                             patient. After reviewing the risks and benefits,                             the patient was deemed in satisfactory condition to                             undergo the  procedure. The anesthesia plan was to                             use moderate sedation / analgesia (conscious                             sedation). Immediately prior to administration of                             medications, the patient was re-assessed for                             adequacy to receive sedatives. The heart rate,                             respiratory rate, oxygen saturations, blood                             pressure, adequacy of pulmonary ventilation, and                             response to care were monitored throughout the                             procedure. The physical status of the patient was                             re-asse ssed after the procedure.                            After obtaining informed consent, the colonoscope                             was passed under direct vision. Throughout the                             procedure, the patient's blood pressure, pulse, and                             oxygen saturations were monitored continuously. The                             colonoscope 524 was introduced through the anus and                             advanced to 10 cm into the ileum. The colonoscopy                             was performed without difficulty. The patient                             tolerated the procedure well. The quality of the                             bowel preparation was fair.                                                                                   Findings:       The perianal and digital rectal examinations were normal.       Hematin (altered blood/coffee-ground-like material) was found in the        entire colon.       The terminal ileum appeared normal.        Scattered small-mouthed diverticula were found in the sigmoid colon.       Internal hemorrhoids were found during retroflexion. The hemorrhoids        were small.                                                                                   Impression:                - Preparation of the colon was fair.                            - Blood in the entire examined colon.                            - The examined portion of the ileum was normal.                            - Diverticulosis in the sigmoid colon.                            - Internal hemorrhoids.                            - No specimens collected.                            - Melena from TI and in entire colon. TI was                             intubated for 10 cm with melena seen hence bleeding                             very unlikely from colon and could be small bowel                             bleeding.  Recommendation:           - Return patient to [facility] for [facility] care.                            - Res ume regular diet.                            - Recommend capsule endoscopy for small bowel                             evaluation to finish anemia work up.                                                                                   Procedure Code(s):       --- Professional ---       62084, Colonoscopy, flexible; diagnostic, including collection of        specimen(s) by brushing or washing, when performed (separate procedure)  Diagnosis Code(s):       --- Professional ---       K92.2, Gastrointestinal hemorrhage, unspecified       K92.1, Melena (includes Hematochezia)       D62, Acute posthemorrhagic anemia       K57.30, Diverticulosis of large intestine without perforation or abscess        without bleeding    CPT copyright 2021 [employer]. All rights reserved.    The codes documented in this report are preliminary and upon  review may   be revised to meet current compliance requirements.    Electronic Signature by Dr. Cheyenne Norwood  __________________   SUSY NORWOOD MD  6/18/2023 11:26:29 AM  I was physically present for the entire viewing portion of the exam.  CHEYENNE NORWOOD MD  Number of Addenda: 0    Note Initiated On: 6/18/2023 8:47 AM  Scope Withdrawal Time: 0  hours 18 minutes 25 seconds   Total Procedure Duration: 0 hours 22 minutes 20 seconds   Scope In: 10:48:53 AM  Scope Out: 11:11:13 AM

## 2023-06-16 NOTE — H&P
"[facility]    History and Physical - Hospitalist Service       Date of Admission:  6/16/2023    Assessment & Plan      Brandon Dye is a 67 year old male who presented to the [facility] ED on 6/16/2023 having increased fatigue, dyspnea, lightheadedness, and vague chest discomfort.  He also had reported some \"dark\" stools.  He was noted to have an anemia with hgb near 7 and was felt to have a probable GI bleed.    Acute GI Bleed (likely related to recent ASA/ibuprofen use)  Acute blood loss anemia  Drug induced plt defect with recent ASA use:  -  Continue protonix drip  -  GI consult  -  NPO for EGD, if doing alright and scope cannot be done until tomorrow could consider some clear liquids this evening.  -  Hold ASA and NSAIDS  -  ED transfusing one PRBC unit due to symptomatic level of anemia.  Recheck hgb later this evening and in AM.    Recent right quad tendon repair late May 2023 ([facility]):  -  Had been on 325 mg daily ASA until a couple days ago (DVT proph) and recently decreased to 81 mg daily.  Also had been taking 3 ibuprofen most days and some days six for 2+ weeks.  Will hold NSAIDS for now.  Utilize tylenol for pain.  -  Also hold ASA for now given GI bleed concern  -  Crutches for mobility, was not scheduled to start PT yet per patients report    Hyperlipidemia:  -  Hold statin for today with bleed concern/NPO status    Atypical Chest discomfort:  -  Slightly elevated troponin.  I suspect SOB, tachycardia when up, chest discomfort mostly related to acute anemia. Transfusing one unit as above.  Will have on telemetry.  Trend troponin.  Check echo.  Doubt primary cardiac problem, suspect constellation of complaints anemia related.       Medical Decision Making       75 MINUTES SPENT BY ME on the date of service doing chart review, history, exam, documentation & further activities per the note.        PPE Used:  Mask  Diet: NPO per Anesthesia Guidelines for Procedure/Surgery Except " for: Meds    DVT Prophylaxis: Ambulate every shift  Mills Catheter: Not present  Lines: None     Cardiac Monitoring: None  Code Status:   Full Code    Clinically Significant Risk Factors Present on Admission                                  Disposition Plan      Expected Discharge Date: 06/18/2023                  Gerry Nicholas DO  Hospitalist Service  [facility]  Securely message with [employer] (more info)  Text page via [employer] Paging/Directory   ____________________________________________________________________    Chief Complaint   Dark stools, fatigue, dyspnea    History is obtained from the patient    History of Present Illness   Brandon Dye is a 67 year old male who presents with worsening fatigue/dyspnea/racing HR when up/lightheadedness/vague chest discomfort.  He reports he started noticing dark stools around at least a week ago.  He has gradually felt worse since then and as he was feeling quite unwell when up and appearing more pale he presented for evaluation today.  He was noted to have a hgb near 7.  He denies history of GI bleeds.  He has been on ASA and ibuprofen since quad tendon surgery late May.  He also has noticed increase GI upset/reflux type complaints past couple weeks.  Denies any bright read blood or emesis.  Denies prior ulcer hx.  He has had prior colonoscopies with last 4-5 years ago which he states was negative for any major problems (gets them in [place]).        Past Medical History    Coronary artery calcification   Hyperlipidemia   Pre-diabetes   Diverticulitis      Past Surgical History   Recent quad tendon repair    Prior to Admission Medications   Prior to Admission Medications   Prescriptions Last Dose Informant Patient Reported? Taking?   acetaminophen (TYLENOL) 325 MG tablet 6/15/2023 at x2 Self Yes Yes   Sig: Take 325-650 mg by mouth every 6 hours as needed for mild pain   aspirin 81 MG EC tablet 6/16/2023 at x2 Self Yes Yes   Sig: Take 81 mg  by mouth every 6 hours as needed for moderate pain   ibuprofen (ADVIL/MOTRIN) 200 MG tablet  Self Yes Yes   Sig: Take 200 mg by mouth every 4 hours as needed for pain      Facility-Administered Medications: None      Allergies   No Known Allergies         Physical Exam   Vital Signs: Temp: 97.4  F (36.3  C) Temp src: Temporal BP: 116/62 Pulse: 89   Resp: 20 SpO2: 100 % O2 Device: None (Room air)    Weight: 225 lbs 0 oz    GEN:  Alert, oriented x 3, appears ill but comfortable.  HEENT:  Normocephalic/atraumatic, no scleral icterus, no nasal discharge, mouth moist.  CV:  Regular rate and rhythm, no loud murmur/rub.  LUNGS:  Clear to auscultation bilaterally without rales/rhonchi/wheezing/retractions.  Symmetric chest rise on inhalation noted.  ABD:  Active bowel sounds, soft, non-tender/non-distended.  No rebound/guarding/rigidity.  EXT:  Trace LE edema.  No cyanosis.  Right leg surgical area dressed (leg brace in place).  Moving feet well with sensation to touch grossly intact.  SKIN:  Dry to touch, no exanthems noted in the visualized areas.    Data     I have personally reviewed the following data over the past 24 hrs:    7.5  \   7.3 (L)   / 198     136 103 26.7 (H) /  133 (H)   3.8 24 0.95 \       Trop: 32 (H) BNP: 99       INR:  1.10 PTT:  N/A   D-dimer:  <0.27 Fibrinogen:  N/A         No results found for this or any previous visit (from the past 24 hour(s)).

## 2023-06-16 NOTE — PROGRESS NOTES
RECEIVING UNIT ED HANDOFF REVIEW    ED Nurse Handoff Report was reviewed by: Citlalli England RN on June 16, 2023 at 3:49 PM

## 2023-06-16 NOTE — ED NOTES
[facility]  ED Nurse Handoff Report    ED Chief complaint: Shortness of Breath and Dizziness      ED Diagnosis:   Final diagnoses:   None       Code Status: Full Code    Allergies: No Known Allergies    Patient Story: Pt had recent knee surgery on left knee at [facility]. Since surgery pt has been feeling fatigued, winded, fast pulse and dizzy.  Focused Assessment:  Pt came to ED for eval of symptoms. Pt is pale upon arrival. Pulse sitting in the 80s but jumps to 120s with any activity. Pt is dizzy and breathless at times with activity. Pt called [facility] and they advised him to come to ED for DVT/PE workup. Labs showed 7.1 hgb, when asked pt reported to RN that he has been having black stools at home for a week but feeling bloated for about a month.   Results for orders placed or performed during the hospital encounter of 06/16/23   D dimer quantitative     Status: Normal   Result Value Ref Range    D-Dimer Quantitative <0.27 0.00 - 0.50 ug/mL FEU    Narrative    This D-dimer assay is intended for use in conjunction with a clinical pretest probability assessment model to exclude pulmonary embolism (PE) and deep venous thrombosis (DVT) in outpatients suspected of PE or DVT. The cut-off value is 0.50 ug/mL FEU.   Basic metabolic panel     Status: Abnormal   Result Value Ref Range    Sodium 136 136 - 145 mmol/L    Potassium 3.8 3.4 - 5.3 mmol/L    Chloride 103 98 - 107 mmol/L    Carbon Dioxide (CO2) 24 22 - 29 mmol/L    Anion Gap 9 7 - 15 mmol/L    Urea Nitrogen 26.7 (H) 8.0 - 23.0 mg/dL    Creatinine 0.95 0.67 - 1.17 mg/dL    Calcium 8.8 8.8 - 10.2 mg/dL    Glucose 133 (H) 70 - 99 mg/dL    GFR Estimate 87 >60 mL/min/1.73m2   Troponin T, High Sensitivity     Status: Abnormal   Result Value Ref Range    Troponin T, High Sensitivity 32 (H) <=22 ng/L   Nt probnp inpatient (BNP)     Status: Normal   Result Value Ref Range    N terminal Pro BNP Inpatient 99 0 - 900 pg/mL   CBC with platelets and differential      Status: Abnormal   Result Value Ref Range    WBC Count 7.5 4.0 - 11.0 10e3/uL    RBC Count 2.38 (L) 4.40 - 5.90 10e6/uL    Hemoglobin 7.3 (L) 13.3 - 17.7 g/dL    Hematocrit 22.1 (L) 40.0 - 53.0 %    MCV 93 78 - 100 fL    MCH 30.7 26.5 - 33.0 pg    MCHC 33.0 31.5 - 36.5 g/dL    RDW 15.1 (H) 10.0 - 15.0 %    Platelet Count 198 150 - 450 10e3/uL    % Neutrophils 56 %    % Lymphocytes 36 %    % Monocytes 6 %    % Eosinophils 2 %    % Basophils 0 %    % Immature Granulocytes 0 %    NRBCs per 100 WBC 0 <1 /100    Absolute Neutrophils 4.2 1.6 - 8.3 10e3/uL    Absolute Lymphocytes 2.7 0.8 - 5.3 10e3/uL    Absolute Monocytes 0.4 0.0 - 1.3 10e3/uL    Absolute Eosinophils 0.2 0.0 - 0.7 10e3/uL    Absolute Basophils 0.0 0.0 - 0.2 10e3/uL    Absolute Immature Granulocytes 0.0 <=0.4 10e3/uL    Absolute NRBCs 0.0 10e3/uL   INR     Status: Normal   Result Value Ref Range    INR 1.10 0.85 - 1.15   EKG 12-lead, tracing only     Status: None   Result Value Ref Range    Systolic Blood Pressure  mmHg    Diastolic Blood Pressure  mmHg    Ventricular Rate 88 BPM    Atrial Rate 88 BPM    CT Interval 132 ms    QRS Duration 84 ms     ms    QTc 447 ms    P Axis 64 degrees    R AXIS 56 degrees    T Axis 43 degrees    Interpretation ECG       Sinus rhythm  Normal ECG  No previous ECGs available  Confirmed by GENERATED REPORT, COMPUTER ([id number]),  Ashley Irizarry ([id number]) on 6/16/2023 1:34:01 PM     Adult Type and Screen     Status: None   Result Value Ref Range    ABO/RH(D) O NEG     Antibody Screen Negative Negative    SPECIMEN EXPIRATION DATE [id number]    CBC with platelets differential     Status: Abnormal    Narrative    The following orders were created for panel order CBC with platelets differential.  Procedure                               Abnormality         Status                     ---------                               -----------         ------                     CBC with platelets and d...[[id number]]  Abnormal             Final result                 Please view results for these tests on the individual orders.   ABO/Rh type and screen     Status: None    Narrative    The following orders were created for panel order ABO/Rh type and screen.  Procedure                               Abnormality         Status                     ---------                               -----------         ------                     Adult Type and Screen[[id number]]                            Final result                 Please view results for these tests on the individual orders.         Treatments and/or interventions provided: consent for blood transfusion completed, IV protonix, IV placement.  Patient's response to treatments and/or interventions:     To be done/followed up on inpatient unit:      Does this patient have any cognitive concerns?: None    Activity level - Baseline/Home:  Independent  Activity Level - Current:   Independent    Patient's Preferred language: English   Needed?: No    Isolation: None  Infection: Not Applicable  Patient tested for COVID 19 prior to admission: NO  Bariatric?: No    Vital Signs:   Vitals:    06/16/23 1157 06/16/23 1230   BP: 116/62    Pulse: 99 89   Resp: 20    Temp: 97.4  F (36.3  C)    TempSrc: Temporal    SpO2: 100%    Weight: 102.1 kg (225 lb)        Cardiac Rhythm:     Was the PSS-3 completed:   Yes  What interventions are required if any?               Family Comments: Wife at bedside  OBS brochure/video discussed/provided to patient/family: N/A              Name of person given brochure if not patient: N/A              Relationship to patient: N/A    For the majority of the shift this patient's behavior was Green.   Behavioral interventions performed were N/A.    ED NURSE PHONE NUMBER: [phone]

## 2023-06-16 NOTE — ED TRIAGE NOTES
Pt had surgery to rt quad tendon on 3 wks ago, for past few days sob with exertion. Feels dizzy when standing     Triage Assessment     Row Name 06/16/23 1153       Triage Assessment (Adult)    Airway WDL WDL       Respiratory WDL    Respiratory WDL rhythm/pattern    Rhythm/Pattern, Respiratory dyspnea on exertion       Cardiac WDL    Cardiac WDL WDL       Cognitive/Neuro/Behavioral WDL    Cognitive/Neuro/Behavioral WDL WDL

## 2023-06-16 NOTE — ED PROVIDER NOTES
History   Chief Complaint:  Shortness of Breath and Dizziness    The history is provided by the patient.      Brandon Dye is a 67 year old male with a history of hyperlipidemia and diverticulitis who presents with shortness of breath and light headedness. The patient reports that he had a recent quad tendon repair surgery, and was taking aspirin. He has had black stool for over a week, and exertional chest pain. He has had the shortness of breath and light headedness for about 4 days, worse the last two. He notes bloating of the abdomen but denies any pain. He denies any leg swelling. No smoking or drinking. He has been taking Advil, Tylenol, and ibuprofen recently as well.    Independent Historian:   None - Patient Only    Review of External Notes:   See MDM.     Medications:    Aspirin   Crestor    Past Medical History:    Coronary artery calcification   Hyperlipidemia   Pre-diabetes   Diverticulitis      Physical Exam     Patient Vitals for the past 24 hrs:   BP Temp Temp src Pulse Resp SpO2 Weight   06/16/23 1230 -- -- -- 89 -- -- --   06/16/23 1157 116/62 97.4  F (36.3  C) Temporal 99 20 100 % 102.1 kg (225 lb)      Physical Exam  Constitutional: Well developed, nontox appearance  Head: Atraumatic.   Neck:  no stridor  Eyes: no scleral icterus, conjunctival pallor  Cardiovascular: RRR, 2+ bilat radial pulses  Pulmonary/Chest: nml resp effort, Clear BS bilat  Abdominal: ND, soft, NT, no rebound or guarding   Rectal: gross melena  Ext: Warm, well perfused, no edema  Neurological: A&O, symmetric facies, moves ext x4  Skin: Skin is warm and dry.   Psychiatric: Behavior is normal. Thought content normal.   Nursing note and vitals reviewed.    Emergency Department Course   ECG  ECG taken at 1211, ECG read at 1215  Normal sinus rhythm   Normal ECG   No significant change as compared to prior, dated 5/23/23.  Rate 88 bpm. MO interval 132 ms. QRS duration 84 ms. QT/QTc 370/447 ms. P-R-T axes 64 56 43.      Laboratory:  Labs Ordered and Resulted from Time of ED Arrival to Time of ED Departure   BASIC METABOLIC PANEL - Abnormal       Result Value    Sodium 136      Potassium 3.8      Chloride 103      Carbon Dioxide (CO2) 24      Anion Gap 9      Urea Nitrogen 26.7 (*)     Creatinine 0.95      Calcium 8.8      Glucose 133 (*)     GFR Estimate 87     TROPONIN T, HIGH SENSITIVITY - Abnormal    Troponin T, High Sensitivity 32 (*)    CBC WITH PLATELETS AND DIFFERENTIAL - Abnormal    WBC Count 7.5      RBC Count 2.38 (*)     Hemoglobin 7.3 (*)     Hematocrit 22.1 (*)     MCV 93      MCH 30.7      MCHC 33.0      RDW 15.1 (*)     Platelet Count 198      % Neutrophils 56      % Lymphocytes 36      % Monocytes 6      % Eosinophils 2      % Basophils 0      % Immature Granulocytes 0      NRBCs per 100 WBC 0      Absolute Neutrophils 4.2      Absolute Lymphocytes 2.7      Absolute Monocytes 0.4      Absolute Eosinophils 0.2      Absolute Basophils 0.0      Absolute Immature Granulocytes 0.0      Absolute NRBCs 0.0     D DIMER QUANTITATIVE - Normal    D-Dimer Quantitative <0.27     NT PROBNP INPATIENT - Normal    N terminal Pro BNP Inpatient 99     INR - Normal    INR 1.10     TYPE AND SCREEN, ADULT    ABO/RH(D) O NEG      Antibody Screen Negative      SPECIMEN EXPIRATION DATE [id number]     PREPARE RED BLOOD CELLS (UNIT)   ABO/RH TYPE AND SCREEN     Emergency Department Course & Assessments:  Interventions:  Medications   pantoprazole (PROTONIX) 80 mg in sodium chloride 0.9 % 100 mL infusion (8 mg/hr Intravenous $New Bag 6/16/23 1334)   pantoprazole (PROTONIX) IV push injection 80 mg (80 mg Intravenous $Given 6/16/23 1318)      Assessments:  1300 I obtained history and examined the patient as reported above.   1332 I rechecked the patient and completed a rectal exam.     Independent Interpretation (X-rays, CTs, rhythm strip):  See MDM.    Consultations/Discussion of Management or Tests:  1350 I spoke with Dr. Nicholas of  the hospitalist service from [facility] regarding patient's presentation, findings, and plan of care.    Social Determinants of Health affecting care:   See MDM.    Disposition:  The patient was admitted to the hospital under the care of Dr. Nicholas.     Impression & Plan    Medical Decision Makin year old male presenting w/ shortness of breath, lightheadedness, chest discomfort     Social determinants affecting patient's health include: No significant social determinants negatively affecting the patient's health     I reviewed medical records from family practice office visit from 2023     DDx includes anemia, PE, dehydration, ACS, upper GI bleed.  Labs significant for anemia, normal creatinine, undetectable D-dimer, minimal trope elevation.  Presentation seems most consistent with symptomatic anemia.  Chest x-ray deferred given clear breath sounds, shortness of breath and chest discomfort likely secondary to anemia from upper GI bleed.  Pantoprazole infusion ordered as noted above.  Blood transfusion started in the emergency department given patient's significant symptoms, assuming ongoing bleeding.  He is hemodynamically stable in the ED.  Patient and wife counseled on all results, disposition and diagnosis.  They are understanding and agreeable to plan. Patient admitted in guarded condition.       Critical care time: 30 minutes excluding procedures    Diagnosis:    ICD-10-CM    1. Upper GI bleed  K92.2            Scribe Disclosure:  I, Travon Syed, am serving as a scribe at 12:51 PM on 2023 to document services personally performed by Dmitri Glaser MD based on my observations and the provider's statements to me.      2023   Dmitri Glaser MD Vaughn, Christopher E, MD  [phone]

## 2023-06-17 ENCOUNTER — APPOINTMENT (OUTPATIENT)
Dept: CARDIOLOGY | Facility: CLINIC | Age: 68
DRG: 378 | End: 2023-06-17
Attending: INTERNAL MEDICINE
Payer: COMMERCIAL

## 2023-06-17 LAB
ANION GAP SERPL CALCULATED.3IONS-SCNC: 10 MMOL/L (ref 7–15)
BLD PROD TYP BPU: NORMAL
BLOOD COMPONENT TYPE: NORMAL
BUN SERPL-MCNC: 20.9 MG/DL (ref 8–23)
CALCIUM SERPL-MCNC: 8.1 MG/DL (ref 8.8–10.2)
CHLORIDE SERPL-SCNC: 108 MMOL/L (ref 98–107)
CODING SYSTEM: NORMAL
CREAT SERPL-MCNC: 0.91 MG/DL (ref 0.67–1.17)
CROSSMATCH: NORMAL
DEPRECATED HCO3 PLAS-SCNC: 21 MMOL/L (ref 22–29)
ERYTHROCYTE [DISTWIDTH] IN BLOOD BY AUTOMATED COUNT: 15.9 % (ref 10–15)
ERYTHROCYTE [DISTWIDTH] IN BLOOD BY AUTOMATED COUNT: 16.5 % (ref 10–15)
GFR SERPL CREATININE-BSD FRML MDRD: >90 ML/MIN/1.73M2
GLUCOSE SERPL-MCNC: 92 MG/DL (ref 70–99)
HCT VFR BLD AUTO: 22.8 % (ref 40–53)
HCT VFR BLD AUTO: 22.8 % (ref 40–53)
HGB BLD-MCNC: 6.5 G/DL (ref 13.3–17.7)
HGB BLD-MCNC: 7.1 G/DL (ref 13.3–17.7)
HGB BLD-MCNC: 7.1 G/DL (ref 13.3–17.7)
HGB BLD-MCNC: 7.5 G/DL (ref 13.3–17.7)
ISSUE DATE AND TIME: NORMAL
LVEF ECHO: NORMAL
MCH RBC QN AUTO: 30.3 PG (ref 26.5–33)
MCH RBC QN AUTO: 30.5 PG (ref 26.5–33)
MCHC RBC AUTO-ENTMCNC: 31.1 G/DL (ref 31.5–36.5)
MCHC RBC AUTO-ENTMCNC: 32.9 G/DL (ref 31.5–36.5)
MCV RBC AUTO: 93 FL (ref 78–100)
MCV RBC AUTO: 97 FL (ref 78–100)
PLATELET # BLD AUTO: 151 10E3/UL (ref 150–450)
PLATELET # BLD AUTO: 157 10E3/UL (ref 150–450)
POTASSIUM SERPL-SCNC: 3.9 MMOL/L (ref 3.4–5.3)
RBC # BLD AUTO: 2.34 10E6/UL (ref 4.4–5.9)
RBC # BLD AUTO: 2.46 10E6/UL (ref 4.4–5.9)
SODIUM SERPL-SCNC: 139 MMOL/L (ref 136–145)
TROPONIN T SERPL HS-MCNC: 23 NG/L
UNIT ABO/RH: NORMAL
UNIT NUMBER: NORMAL
UNIT STATUS: NORMAL
UNIT TYPE ISBT: 9500
UPPER GI ENDOSCOPY: NORMAL
WBC # BLD AUTO: 6.2 10E3/UL (ref 4–11)
WBC # BLD AUTO: 7.8 10E3/UL (ref 4–11)

## 2023-06-17 PROCEDURE — 43239 EGD BIOPSY SINGLE/MULTIPLE: CPT | Performed by: INTERNAL MEDICINE

## 2023-06-17 PROCEDURE — 93306 TTE W/DOPPLER COMPLETE: CPT

## 2023-06-17 PROCEDURE — 88342 IMHCHEM/IMCYTCHM 1ST ANTB: CPT | Mod: 26

## 2023-06-17 PROCEDURE — 85027 COMPLETE CBC AUTOMATED: CPT | Performed by: INTERNAL MEDICINE

## 2023-06-17 PROCEDURE — 250N000011 HC RX IP 250 OP 636: Performed by: INTERNAL MEDICINE

## 2023-06-17 PROCEDURE — 88305 TISSUE EXAM BY PATHOLOGIST: CPT | Mod: TC | Performed by: INTERNAL MEDICINE

## 2023-06-17 PROCEDURE — 99233 SBSQ HOSP IP/OBS HIGH 50: CPT | Performed by: INTERNAL MEDICINE

## 2023-06-17 PROCEDURE — 93306 TTE W/DOPPLER COMPLETE: CPT | Mod: 26 | Performed by: INTERNAL MEDICINE

## 2023-06-17 PROCEDURE — 85018 HEMOGLOBIN: CPT | Performed by: INTERNAL MEDICINE

## 2023-06-17 PROCEDURE — C9113 INJ PANTOPRAZOLE SODIUM, VIA: HCPCS | Performed by: INTERNAL MEDICINE

## 2023-06-17 PROCEDURE — 250N000009 HC RX 250: Performed by: INTERNAL MEDICINE

## 2023-06-17 PROCEDURE — 0DB68ZX EXCISION OF STOMACH, VIA NATURAL OR ARTIFICIAL OPENING ENDOSCOPIC, DIAGNOSTIC: ICD-10-PCS | Performed by: INTERNAL MEDICINE

## 2023-06-17 PROCEDURE — P9016 RBC LEUKOCYTES REDUCED: HCPCS | Performed by: INTERNAL MEDICINE

## 2023-06-17 PROCEDURE — 84484 ASSAY OF TROPONIN QUANT: CPT | Performed by: INTERNAL MEDICINE

## 2023-06-17 PROCEDURE — 36415 COLL VENOUS BLD VENIPUNCTURE: CPT | Performed by: INTERNAL MEDICINE

## 2023-06-17 PROCEDURE — 88305 TISSUE EXAM BY PATHOLOGIST: CPT | Mod: 26

## 2023-06-17 PROCEDURE — 258N000003 HC RX IP 258 OP 636: Performed by: INTERNAL MEDICINE

## 2023-06-17 PROCEDURE — G0500 MOD SEDAT ENDO SERVICE >5YRS: HCPCS | Performed by: INTERNAL MEDICINE

## 2023-06-17 PROCEDURE — 250N000013 HC RX MED GY IP 250 OP 250 PS 637: Performed by: INTERNAL MEDICINE

## 2023-06-17 PROCEDURE — 120N000001 HC R&B MED SURG/OB

## 2023-06-17 PROCEDURE — 80048 BASIC METABOLIC PNL TOTAL CA: CPT | Performed by: INTERNAL MEDICINE

## 2023-06-17 RX ORDER — POLYETHYLENE GLYCOL 3350 17 G/17G
238 POWDER, FOR SOLUTION ORAL ONCE
Status: COMPLETED | OUTPATIENT
Start: 2023-06-17 | End: 2023-06-17

## 2023-06-17 RX ORDER — FENTANYL CITRATE 50 UG/ML
INJECTION, SOLUTION INTRAMUSCULAR; INTRAVENOUS PRN
Status: DISCONTINUED | OUTPATIENT
Start: 2023-06-17 | End: 2023-06-17 | Stop reason: HOSPADM

## 2023-06-17 RX ORDER — BISACODYL 5 MG
20 TABLET, DELAYED RELEASE (ENTERIC COATED) ORAL ONCE
Status: COMPLETED | OUTPATIENT
Start: 2023-06-17 | End: 2023-06-17

## 2023-06-17 RX ADMIN — PANTOPRAZOLE SODIUM 40 MG: 40 INJECTION, POWDER, FOR SOLUTION INTRAVENOUS at 08:17

## 2023-06-17 RX ADMIN — POLYETHYLENE GLYCOL 3350 238 G: 17 POWDER, FOR SOLUTION ORAL at 18:37

## 2023-06-17 RX ADMIN — SODIUM CHLORIDE 250 ML: 9 INJECTION, SOLUTION INTRAVENOUS at 15:55

## 2023-06-17 RX ADMIN — BISACODYL 20 MG: 5 TABLET, COATED ORAL at 13:05

## 2023-06-17 RX ADMIN — PANTOPRAZOLE SODIUM 40 MG: 40 INJECTION, POWDER, FOR SOLUTION INTRAVENOUS at 21:39

## 2023-06-17 ASSESSMENT — ACTIVITIES OF DAILY LIVING (ADL)
ADLS_ACUITY_SCORE: 24
ADLS_ACUITY_SCORE: 22
ADLS_ACUITY_SCORE: 24
ADLS_ACUITY_SCORE: 24
ADLS_ACUITY_SCORE: 22
ADLS_ACUITY_SCORE: 24
ADLS_ACUITY_SCORE: 22
ADLS_ACUITY_SCORE: 22
ADLS_ACUITY_SCORE: 24
ADLS_ACUITY_SCORE: 24

## 2023-06-17 NOTE — PLAN OF CARE
Goal Outcome Evaluation:      Plan of Care Reviewed With: patient, spouse    Overall Patient Progress: no changeOverall Patient Progress: no change    Orientation-AxOx4.    Vitals/Tele-VSS on room air/lungs clear. Denies pain.    IV Access/drains-IVF's infusing in RUE. SL IV RUE.    Diet-Clear liquids/tolerated well with no nausea. NPO after midnight for EGD tomorrow.    Mobility-SBA/crutches, has immobilizer on RLE.    GI/-voids adequate in bathroom, No BM since this morning before he came in to hospital.    Wound/Skin-intact except healing scar on right knee.    Consults-GI/hospitalist.    Discharge Plan-pending progress. Received 1 unit PRBC's that was infusing on admit to unit from ED. Hgb 7.3-recheck at 2100.      See Flow sheets for assessment

## 2023-06-17 NOTE — PLAN OF CARE
Goal Outcome Evaluation:      Plan of Care Reviewed With: patient    Overall Patient Progress: improvingOverall Patient Progress: improving    A&OX4. VSS on RA. Tele-NS. Denies pain, N/V. Hgb checked and came back with 7.2, MD notified, recheck again at AM. Tolerating clear liquid diet, NPO at midnight. Up  with independently w/crutches, immobilizer on RLE. Void adequately. No BM for shift. GI consult. Continue monitoring.

## 2023-06-17 NOTE — PROVIDER NOTIFICATION
MD Notification    Notified Person: MD    Notified Person Name: Dr. Lassiter    Notification Date/Time: 6/17/2023 4908    Notification Interaction: Text paged    Purpose of Notification: Critical lab Hgb 6.5    Orders Received: yes, transfuse 1 unit PRBC's.    Comments:

## 2023-06-17 NOTE — PLAN OF CARE
Goal Outcome Evaluation:    Orientation- Aox4    Vitals/Tele- Tele-NSR, no c/o pain     Abnormal labs- Hgb 6.5, 1 unit of blood was given    IV Access/drains- 2 L&R PIV. R PIV infusing NS @ 75 ml/hr    Diet- NPO, started bowel prep for colonoscopy at 9 am tomorrow.    Mobility- Independent    GI/- No concern of B/B    Wound/Skin- healing incision on the right knee    Consults- GI     Discharge Plan- pending    See Flow sheets for assessment

## 2023-06-17 NOTE — PROGRESS NOTES
"[facility]    Medicine Progress Note - Hospitalist Service    Date of Admission:  6/16/2023    Assessment & Plan      67 year old male who presented to the [facility] ED on 6/16/2023 having increased fatigue, dyspnea, lightheadedness, and vague chest discomfort.    He also had reported some \"dark\" stools.  He was noted to have an anemia with hgb near 7 and was felt to have a probable GI bleed.    Acute GI Bleed (likely related to recent ASA/ibuprofen use)  Acute blood loss anemia  Drug induced plt defect with recent ASA use:  -  Continue protonix drip  -  Seen by GI  -He had EGD today and it did not show any active bleeding so colonoscopy will be done tomorrow  He is on clear liquid diet  -  Hold ASA and NSAIDS  -Patient had 1 unit of packed RBCs and today hemoglobin is 6.5 so we will transfuse 1 more unit  Continue to monitor hemoglobin  He is on IV fluid and I lowered the rate from 100 mL/h to 75 mL  He is a scheduled for colonoscopy tomorrow and I am continuing IV fluids as he will get dehydrated due to prep  IV fluid can be discontinued tomorrow after colonoscopy    Recent right quad tendon repair late May 2023 ([facility] [facility]):  -  Had been on 325 mg daily ASA until a couple days ago (DVT proph) and recently decreased to 81 mg daily.    Also had been taking 3 ibuprofen most days and some days six for 2+ weeks.    Will hold NSAIDS for now.    Utilize tylenol for pain.  -  Also hold ASA for now given GI bleed concern  -  Crutches for mobility, was not scheduled to start PT yet per patients report  Educated about NSAIDS  Avoid NSAIDS and if absolutely needed in future then consider celebrex or topical voltaren   If take oral NSAIDS then make sure take PPI to sooth stomach    Hyperlipidemia:      Acute blood loss anemia:  He had 1 unit already and hemoglobin is 6.5  We will transfuse another unit today    Atypical Chest discomfort:  -  Slightly elevated troponin.   chest discomfort mostly related to " "acute anemia.   Got one unit of PRBC .     telemetry.    Trend troponin.  Troponin is going down  Troponin is trending down  Echo shows satisfactory results with ejection fraction 60 to 65%  Tachycardia improved with IV hydration and PRBC     Discussed care with patient and his RN  GI doctor communicated via text message        Diet: Clear Liquid Diet    DVT Prophylaxis: Pneumatic Compression Devices  Mills Catheter: Not present  Lines: None     Cardiac Monitoring: ACTIVE order. Indication: Tachyarrhythmias, acute (48 hours)  Code Status: Full Code      Clinically Significant Risk Factors                         # Overweight: Estimated body mass index is 28.12 kg/m  as calculated from the following:    Height as of 5/23/23: 1.905 m (6' 3\").    Weight as of this encounter: 102.1 kg (225 lb)., PRESENT ON ADMISSION          Disposition Plan     Expected Discharge Date:   06/18/2023                  Betsey Lassiter MD  Hospitalist Service  [facility]  Securely message with [employer] (more info)  Text page via [employer] Paging/Directory   ______________________________________________________________________    Interval History   No chest pain or sob  No BM since admission   No nausea or epigastric pain     Physical Exam   Vital Signs: Temp: 98.6  F (37  C) Temp src: Oral BP: 117/71 Pulse: 78   Resp: 16 SpO2: 98 % O2 Device: None (Room air)    Weight: 225 lbs 0 oz        GENERAL APPEARANCE: healthy, alert and no distress  EYES: Eyes grossly normal to inspection, PERRL and conjunctivae and sclerae normal  NECK: no adenopathy  RESP: lungs clear to auscultation - no rales, rhonchi or wheezes  CV: regular rates and rhythm, normal S1 S2, no S3    Knee scar on right side healing nicely          Medical Decision Making             Data     I have personally reviewed the following data over the past 24 hrs:    6.2  \   6.5 (LL)   / 151     139 108 (H) 20.9 /  92   3.9 21 (L) 0.91 \       Trop: 23 (H) BNP: " N/A

## 2023-06-17 NOTE — PROVIDER NOTIFICATION
.MD Notification    Notified Person: MD    Notified Person Name: Dr. Blancharddeseaneverett    Notification Date/Time: 06/16. 2300.    Notification Interaction: page    Purpose of Notification: Pt Hgb is 7.2.    Orders Received: Will be recheck at AM.    Comments: Pt Hgb was 7.3, 1U of blood given per order and rechecked, came back with 7.2.

## 2023-06-18 LAB
COLONOSCOPY: NORMAL
HGB BLD-MCNC: 7.2 G/DL (ref 13.3–17.7)
HGB BLD-MCNC: 7.4 G/DL (ref 13.3–17.7)

## 2023-06-18 PROCEDURE — 85018 HEMOGLOBIN: CPT | Performed by: INTERNAL MEDICINE

## 2023-06-18 PROCEDURE — 99153 MOD SED SAME PHYS/QHP EA: CPT | Performed by: INTERNAL MEDICINE

## 2023-06-18 PROCEDURE — 36415 COLL VENOUS BLD VENIPUNCTURE: CPT | Performed by: INTERNAL MEDICINE

## 2023-06-18 PROCEDURE — 99232 SBSQ HOSP IP/OBS MODERATE 35: CPT | Performed by: INTERNAL MEDICINE

## 2023-06-18 PROCEDURE — 120N000001 HC R&B MED SURG/OB

## 2023-06-18 PROCEDURE — C9113 INJ PANTOPRAZOLE SODIUM, VIA: HCPCS | Performed by: INTERNAL MEDICINE

## 2023-06-18 PROCEDURE — 250N000011 HC RX IP 250 OP 636: Performed by: INTERNAL MEDICINE

## 2023-06-18 PROCEDURE — G0500 MOD SEDAT ENDO SERVICE >5YRS: HCPCS | Performed by: INTERNAL MEDICINE

## 2023-06-18 PROCEDURE — 0DJD8ZZ INSPECTION OF LOWER INTESTINAL TRACT, VIA NATURAL OR ARTIFICIAL OPENING ENDOSCOPIC: ICD-10-PCS | Performed by: INTERNAL MEDICINE

## 2023-06-18 PROCEDURE — 258N000003 HC RX IP 258 OP 636: Performed by: INTERNAL MEDICINE

## 2023-06-18 PROCEDURE — 45378 DIAGNOSTIC COLONOSCOPY: CPT | Performed by: INTERNAL MEDICINE

## 2023-06-18 PROCEDURE — 250N000013 HC RX MED GY IP 250 OP 250 PS 637: Performed by: INTERNAL MEDICINE

## 2023-06-18 RX ORDER — FENTANYL CITRATE 50 UG/ML
INJECTION, SOLUTION INTRAMUSCULAR; INTRAVENOUS PRN
Status: DISCONTINUED | OUTPATIENT
Start: 2023-06-18 | End: 2023-06-18 | Stop reason: HOSPADM

## 2023-06-18 RX ORDER — PANTOPRAZOLE SODIUM 40 MG/1
40 TABLET, DELAYED RELEASE ORAL
Status: DISCONTINUED | OUTPATIENT
Start: 2023-06-19 | End: 2023-06-19

## 2023-06-18 RX ADMIN — SODIUM PHOSPHATE, DIBASIC AND SODIUM PHOSPHATE, MONOBASIC 1 ENEMA: 7; 19 ENEMA RECTAL at 09:12

## 2023-06-18 RX ADMIN — SODIUM CHLORIDE: 9 INJECTION, SOLUTION INTRAVENOUS at 00:54

## 2023-06-18 RX ADMIN — PANTOPRAZOLE SODIUM 40 MG: 40 INJECTION, POWDER, FOR SOLUTION INTRAVENOUS at 08:50

## 2023-06-18 ASSESSMENT — ACTIVITIES OF DAILY LIVING (ADL)
ADLS_ACUITY_SCORE: 24
ADLS_ACUITY_SCORE: 20
ADLS_ACUITY_SCORE: 24
ADLS_ACUITY_SCORE: 24
ADLS_ACUITY_SCORE: 20
ADLS_ACUITY_SCORE: 24
ADLS_ACUITY_SCORE: 24
ADLS_ACUITY_SCORE: 20
ADLS_ACUITY_SCORE: 20
ADLS_ACUITY_SCORE: 24
ADLS_ACUITY_SCORE: 24
ADLS_ACUITY_SCORE: 20

## 2023-06-18 NOTE — PLAN OF CARE
Goal Outcome Evaluation:    A&Ox4. VSS on RA, Tele: SR. Denies pain & N/V. Hemoglobin 7.4, recheck @15:30 & tomorrow AM. Colonoscopy today, no bleed found. Advanced to low fiber diet & tolerating. Up independently w/crutches, immobilizer on RLE. Voiding adequately. GI following, discharge pending.

## 2023-06-18 NOTE — PROGRESS NOTES
"[facility]    Medicine Progress Note - Hospitalist Service    Date of Admission:  6/16/2023    Assessment & Plan     67 year old male who presented to the [facility] ED on 6/16/2023 having increased fatigue, dyspnea, lightheadedness, and vague chest discomfort.    He also had reported some \"dark\" stools.  He was noted to have an anemia with hgb near 7 and was felt to have a probable GI bleed.  Recently had been taking NSAIDS post recent right quad tendon repair     1. Acute GI Bleed  Acute blood loss anemia  Drug induced plt defect with recent ASA use:    -  Seen by GI (appreciated)   -He had EGD today and it did not show any active bleeding so colonoscopy will be done this am  He has tolerated clear liquids since admission  -  Hold ASA and NSAIDS  S/p 2 units PRBCs since admission    Continue to monitor hemoglobin -  Recheck this afternoon  He is on IV fluid - continue prior to colonoscopy - hopefully can discharge if able to tolerate diet again later    addendum - 1220    Colonoscopy report reviewed  - melena noted from terminal ileum and in the entire colon.  Diverticulosis in sigmoid.   Small bowel bleeding suspected; GI recommending capsule endoscopy    GI ok to advancing to  Low fiber diet, for now  hgb recheck this afternoon  Change protonix to 40mg po daily today    2. Recent right quad tendon repair late May 2023 ([facility] [facility]):  -  Had been on 325 mg daily ASA until a couple days ago (DVT proph) and recently decreased to 81 mg daily.    Also had been taking 3 ibuprofen most days and some days six for 2+ weeks.    Continue to hold NSAIDS (including ASA) for now.    Utilize tylenol and po narcotics prn for pain.    -  Crutches for mobility, was not scheduled to start PT yet per patients report    3. Hyperlipidemia:     4. Atypical Chest discomfort:  -  Slightly elevated troponin; trending down since admission  chest discomfort mostly related to acute anemia.       On telemetry.      Echo " (6/17/23) - shows satisfactory results with ejection fraction 60 to 65%    Continue to monitor.   If no new clinical changes,   Will consider further cardiac outpt work-up after hospital discharge.     Medical Decision Making     46 MINUTES SPENT BY ME on the date of service doing chart review, history, exam, documentation & further activities per the note.        PPE Worn:  Mask, gloves     Diet: Low Fiber Diet    DVT Prophylaxis: up out of bed, ambulate  Mills Catheter: Not present  Lines: None     Cardiac Monitoring: ACTIVE order. Indication: Tachyarrhythmias, acute (48 hours)  Code Status: Full Code      Clinically Significant Risk Factors     Disposition Plan  requiring ongoing inpatient evaluation, lab monitoring and treatment today       Aarti Valdez,   Hospitalist Service  [facility]  Securely message with [employer] (more info)  Text page via [employer] Paging/Directory   ______________________________________________________________________    Interval History   Seen with wife in the room.   Stool liquid brown this am; just had enema and now clear liquid stool. No lightheadedness or dizziness.   No CP or SOB  Reported currently.    Waiting for colonoscopy today.    Physical Exam   Vital Signs: Temp: 98.2  F (36.8  C) Temp src: Oral BP: 125/75 Pulse: 78   Resp: 10 SpO2: 99 % O2 Device: None (Room air)    Weight: 225 lbs 0 oz    GEN:  Alert, awake and orientated, appears comfortable, no overt respiratoy distress.  HEENT:  Normocephalic/atraumatic, no scleral icterus, no nasal discharge, mouth moist.  CV:  Regular rate and rhythm, no murmur or JVD.  S1 + S2 noted, no S3 or S4.  LUNGS:  Clear to auscultation ant bilaterally without rales/rhonchi/wheezing/retractions.  Symmetric chest rise on inhalation noted.  EXT:  Right leg in brace with dry, gauze dressing intact      Medications       [START ON 6/19/2023] pantoprazole  40 mg Oral QAM AC       Data     Labs and Imaging results  below reviewed today.     Recent Labs   Lab 23  1234   WBC  --  7.8  --  6.2  --  7.5   HGB 7.4* 7.5* 6.5* 7.1*  7.1*   < > 7.3*   HCT  --  22.8*  --  22.8*  --  22.1*   MCV  --  93  --  97  --  93   PLT  --  157  --  151  --  198    < > = values in this interval not displayed.     Recent Labs   Lab 23  1234   WBC  --  7.8  --  6.2  --  7.5   HGB 7.4* 7.5* 6.5* 7.1*  7.1*   < > 7.3*   HCT  --  22.8*  --  22.8*  --  22.1*   MCV  --  93  --  97  --  93   PLT  --  157  --  151  --  198    < > = values in this interval not displayed.     Recent Labs   Lab 23  1234    136   POTASSIUM 3.9 3.8   CHLORIDE 108* 103   CO2 21* 24   ANIONGAP 10 9   GLC 92 133*   BUN 20.9 26.7*   CR 0.91 0.95   GFRESTIMATED >90 87   JI 8.1* 8.8     7-Day Micro Results     No results found for the last 168 hours.        Recent Labs   Lab 23  1411   HGB 7.4* 7.5* 6.5*       Recent Results (from the past 24 hour(s))   Echocardiogram Complete   Result Value    LVEF  60-65%    [facility]    [id number]  [id number]  [id number]  [id number]^ONEIL^SEGUNDO^PRASHANT     [facility]  Echocardiography Laboratory  [address] [place] [id number]     Name: TATE JAIMES  MRN: [id number]  : 1955  Study Date: 2023 12:06 PM  Age: 67 yrs  Gender: Male  Patient Location: [facility]  Reason For Study: Palpitations, Chest Discomfort  Ordering Physician: SEGUNDO RIGGS  Performed By: GIDEON     BSA: 2.3 m2  Height: 75 in  Weight: 224 lb  HR: 74  BP: 132/78 mmHg  ______________________________________________________________________________  Procedure  Complete Portable Echo Adult.  ______________________________________________________________________________  Interpretation Summary     The visual ejection fraction is  60-65%.  Normal left ventricular wall motion  Right ventricular systolic pressure is normal.  There is no comparison study available.  ______________________________________________________________________________  Left Ventricle  The left ventricle is normal in size. There is normal left ventricular wall  thickness. Left ventricular systolic function is normal. The visual ejection  fraction is 60-65%. Normal left ventricular wall motion. No evidence of left  ventricular mass or tumors.     Right Ventricle  The right ventricle is normal in structure, function and size.     Atria  Normal left atrial size. Right atrial size is normal.     Mitral Valve  Thickened mitral valve anterior leaflet.     Tricuspid Valve  Normal tricuspid valve. There is trace tricuspid regurgitation. Right  ventricular systolic pressure is normal.     Aortic Valve  There is trivial trileaflet aortic sclerosis. There is trace aortic  regurgitation.     Pulmonic Valve  Normal pulmonic valve.     Vessels  The aortic root is normal size.     Pericardium  The pericardium appears normal.     Rhythm  Sinus rhythm was noted.  ______________________________________________________________________________  MMode/2D Measurements & Calculations     IVSd: 1.1 cm  LVIDd: 4.4 cm  LVIDs: 2.6 cm  LVPWd: 1.2 cm  FS: 40.5 %  LV mass(C)d: 180.0 grams  LV mass(C)dI: 78.2 grams/m2  Ao root diam: 3.6 cm  LA dimension: 4.6 cm  asc Aorta Diam: 3.2 cm  LA/Ao: 1.3  LA Volume (BP): 56.3 ml  LA Volume Index (BP): 24.5 ml/m2     RA Area: 15.3 cm2  RWT: 0.55  TAPSE: 2.2 cm     Doppler Measurements & Calculations  MV E max lenin: 86.5 cm/sec  MV A max lenin: 70.2 cm/sec  MV E/A: 1.2  MV dec time: 0.24 sec  PA acc time: 0.11 sec  TR max lenin: 254.5 cm/sec  TR max P.9 mmHg  E/E' avg: 10.1     Lateral E/e': 10.1  Medial E/e': 10.1  RV S Lenin: 23.4 cm/sec     ______________________________________________________________________________  Report approved by: Sita Mirza  06/17/2023 01:38 PM

## 2023-06-18 NOTE — PLAN OF CARE
Goal Outcome Evaluation:      Plan of Care Reviewed With: patient    Overall Patient Progress: improvingOverall Patient Progress: improving    A&OX4. VSS on RA. Tele-NS. Denies pain, N/V. Hgb 7.5, recheck again at AM. Tolerating clear liquid diet. Bowel prep done, had dark/loose BM. Up independently w/crutches, immobilizer on RLE. Void adequately. GI consult. Plan for colonoscopy today AM. Continue monitoring.

## 2023-06-19 LAB
BILIRUB DIRECT SERPL-MCNC: <0.2 MG/DL (ref 0–0.3)
BILIRUB SERPL-MCNC: 0.5 MG/DL
BLD PROD TYP BPU: NORMAL
BLOOD COMPONENT TYPE: NORMAL
CO COMPONENTS: NORMAL
CODING SYSTEM: NORMAL
CROSSMATCH: NORMAL
GRAM/CULTURE INDICATED?: YES
HGB BLD-MCNC: 7 G/DL (ref 13.3–17.7)
HGB BLD-MCNC: 7.8 G/DL (ref 13.3–17.7)
HGB BLD-MCNC: 8 G/DL (ref 13.3–17.7)
IRON BINDING CAPACITY (ROCHE): 228 UG/DL (ref 240–430)
IRON SATN MFR SERPL: 16 % (ref 15–46)
IRON SERPL-MCNC: 37 UG/DL (ref 61–157)
ISSUE DATE AND TIME: NORMAL
LDH SERPL L TO P-CCNC: 173 U/L (ref 0–250)
OTHER UNITS TRANSFUSED: NORMAL
POST RXN CLERICAL CHECK: NORMAL
POST SPECIMEN APPEARANCE: NORMAL
POST-RXN ABO/RH: NORMAL
POST-RXN POLY DAT: NEGATIVE
PRODUCT CODE: NORMAL
SPECIMEN EXPIRATION DATE: NORMAL
UNIT ABO/RH: NORMAL
UNIT BLOOD TYPE TRANSFUSION REACTION: NORMAL
UNIT NUMBER: NORMAL
UNIT NUMBER: NORMAL
UNIT STATUS: NORMAL
UNIT TYPE ISBT: 9500

## 2023-06-19 PROCEDURE — 82248 BILIRUBIN DIRECT: CPT | Performed by: HOSPITALIST

## 2023-06-19 PROCEDURE — 250N000013 HC RX MED GY IP 250 OP 250 PS 637: Performed by: INTERNAL MEDICINE

## 2023-06-19 PROCEDURE — 250N000013 HC RX MED GY IP 250 OP 250 PS 637: Performed by: HOSPITALIST

## 2023-06-19 PROCEDURE — 82728 ASSAY OF FERRITIN: CPT | Performed by: PATHOLOGY

## 2023-06-19 PROCEDURE — 120N000001 HC R&B MED SURG/OB

## 2023-06-19 PROCEDURE — P9016 RBC LEUKOCYTES REDUCED: HCPCS | Performed by: HOSPITALIST

## 2023-06-19 PROCEDURE — 99233 SBSQ HOSP IP/OBS HIGH 50: CPT | Performed by: HOSPITALIST

## 2023-06-19 PROCEDURE — 83010 ASSAY OF HAPTOGLOBIN QUANT: CPT | Performed by: HOSPITALIST

## 2023-06-19 PROCEDURE — 36415 COLL VENOUS BLD VENIPUNCTURE: CPT | Performed by: INTERNAL MEDICINE

## 2023-06-19 PROCEDURE — 86078 PHYS BLOOD BANK SERV REACTJ: CPT | Performed by: PATHOLOGY

## 2023-06-19 PROCEDURE — 120N000013 HC R&B IMCU

## 2023-06-19 PROCEDURE — 36415 COLL VENOUS BLD VENIPUNCTURE: CPT | Performed by: HOSPITALIST

## 2023-06-19 PROCEDURE — 87040 BLOOD CULTURE FOR BACTERIA: CPT | Performed by: PATHOLOGY

## 2023-06-19 PROCEDURE — 83550 IRON BINDING TEST: CPT | Performed by: PATHOLOGY

## 2023-06-19 PROCEDURE — 85018 HEMOGLOBIN: CPT | Performed by: HOSPITALIST

## 2023-06-19 PROCEDURE — 83615 LACTATE (LD) (LDH) ENZYME: CPT | Performed by: HOSPITALIST

## 2023-06-19 PROCEDURE — 85018 HEMOGLOBIN: CPT | Performed by: INTERNAL MEDICINE

## 2023-06-19 RX ORDER — PANTOPRAZOLE SODIUM 40 MG/1
40 TABLET, DELAYED RELEASE ORAL
Status: DISCONTINUED | OUTPATIENT
Start: 2023-06-19 | End: 2023-06-21 | Stop reason: HOSPADM

## 2023-06-19 RX ORDER — LIDOCAINE 40 MG/G
CREAM TOPICAL
Status: DISCONTINUED | OUTPATIENT
Start: 2023-06-19 | End: 2023-06-21 | Stop reason: HOSPADM

## 2023-06-19 RX ORDER — ACETAMINOPHEN 325 MG/1
650 TABLET ORAL EVERY 6 HOURS PRN
Status: DISCONTINUED | OUTPATIENT
Start: 2023-06-19 | End: 2023-06-21 | Stop reason: HOSPADM

## 2023-06-19 RX ORDER — IOPAMIDOL 755 MG/ML
135 INJECTION, SOLUTION INTRAVASCULAR ONCE
Status: DISCONTINUED | OUTPATIENT
Start: 2023-06-19 | End: 2023-06-21 | Stop reason: HOSPADM

## 2023-06-19 RX ORDER — ACETAMINOPHEN 650 MG/1
650 SUPPOSITORY RECTAL EVERY 6 HOURS PRN
Status: DISCONTINUED | OUTPATIENT
Start: 2023-06-19 | End: 2023-06-21 | Stop reason: HOSPADM

## 2023-06-19 RX ADMIN — PANTOPRAZOLE SODIUM 40 MG: 40 TABLET, DELAYED RELEASE ORAL at 06:30

## 2023-06-19 RX ADMIN — ACETAMINOPHEN 650 MG: 325 TABLET ORAL at 21:26

## 2023-06-19 RX ADMIN — PANTOPRAZOLE SODIUM 40 MG: 40 TABLET, DELAYED RELEASE ORAL at 18:24

## 2023-06-19 RX ADMIN — ACETAMINOPHEN 650 MG: 325 TABLET ORAL at 14:55

## 2023-06-19 ASSESSMENT — ACTIVITIES OF DAILY LIVING (ADL)
ADLS_ACUITY_SCORE: 20

## 2023-06-19 NOTE — PROGRESS NOTES
[facility]  Gastroenterology Progress Note     Brandon Dye MRN# [id number]   YOB: 1955 Age: 67 year old          Assessment and Plan:   Brandon Dye is a67 year old male who presented to the [facility] ED on 6/16/2023 having increased fatigue, dyspnea, lightheadedness, and vague chest discomfort with melena and anemia    Symptomatic anemia  GI bleed  6/17 EGD unremarkable  6/17 Colonoscopy noted no source of blood loss and noted melenotic stool in terminal ileum  Hemoglobin 7.0 and receiving a unit  LIkely has small bowel bleed    -- CTA today of A/P  -- Serial hemoglobin and transfuse if 7 or less  -- continue PPI BID                  Interval History:   no new complaints, doing well and doing well; no cp, sob, n/v/d, or abd pain.              Review of Systems:   C: NEGATIVE for fever, chills, change in weight  E/M: NEGATIVE for ear, mouth and throat problems  R: NEGATIVE for significant cough or SOB  CV: NEGATIVE for chest pain, palpitations or peripheral edema             Medications:   I have reviewed this patient's current medications    pantoprazole  40 mg Oral QAM AC                  Physical Exam:   Vitals were reviewed  Vital Signs with Ranges  Temp:  [98.2  F (36.8  C)-98.7  F (37.1  C)] 98.5  F (36.9  C)  Pulse:  [78-88] 88  Resp:  [12-18] 18  BP: (121-133)/(63-76) 128/76  SpO2:  [97 %-99 %] 98 %  No intake/output data recorded.  Constitutional: healthy, alert and no distress   Cardiovascular: negative, PMI normal. No lifts, heaves, or thrills. RRR. No murmurs, clicks gallops or rub  Respiratory: negative, Percussion normal. Good diaphragmatic excursion. Lungs clear  Abdomen: Abdomen soft, non-tender. BS normal. No masses, organomegaly           Data:   I reviewed the patient's new clinical lab test results.   Recent Labs   Lab Test 06/19/23  0749 06/18/23  1524 06/18/23  0632 06/17/23  2253 06/17/23  1411 06/17/23  0721 06/16/23  2122 06/16/23  1234   WBC  --   --    --  7.8  --  6.2  --  7.5   HGB 7.0* 7.2* 7.4* 7.5*   < > 7.1*  7.1*   < > 7.3*   MCV  --   --   --  93  --  97  --  93   PLT  --   --   --  157  --  151  --  198   INR  --   --   --   --   --   --   --  1.10    < > = values in this interval not displayed.     Recent Labs   Lab Test 06/17/23  0721 06/16/23  1234 05/23/23  1420   POTASSIUM 3.9 3.8 4.37   CHLORIDE 108* 103 104.5   CO2 21* 24 27.7   BUN 20.9 26.7* 23  22.5   ANIONGAP 10 9  --      No lab results found.    Invalid input(s): ASIA CEBALLOS reviewed the patient's new imaging results.    All laboratory data reviewed  All imaging studies reviewed by me.    Teetee Frias PA-C,  6/19/2023  [facility]  Office : [phone]  Cell: [phone] (Dr. Hearn)  Cell: [phone] (Teetee Frias PA-C)

## 2023-06-19 NOTE — PLAN OF CARE
[id number]. A&Ox4, VSS on RA. Patient denies pain and denies nausea. Up IND with crutches, prior right knee surgery, uses knee imobilizer. Using BR, continent, passing gas, no BM yet. Tele is NSR. Toelrating low fiber diet. Hgb recheck this AM. Continue to monitor.

## 2023-06-19 NOTE — PROGRESS NOTES
[facility]    Medicine Progress Note - Hospitalist Service    Date of Admission:  6/16/2023    Assessment & Plan     This is a 67-year-old male who presented to the ED on 6/16 with a chief complaint of increasing fatigue, dyspnea, lightheadedness and weak chest discomfort and was also noted to have dark stools.  Patient has been taking significant amount of NSAIDs for right quadrant tendon repair.  His hemoglobin on presentation was 7.3 and of note his hemoglobin on 5/23/2023 was stable at 14.3.  Patient's aspirin and NSAIDs were held and GI was consulted    Acute GI bleed  Acute blood loss anemia due to above  On admission presented with symptomatic anemia and was found to have hemoglobin of 7.3-and his hemoglobin recently on 5/23/2023 was stable at 14.3.  -His aspirin and NSAIDs were held  -GI was consulted and he was started on Protonix drip and was given 2 units of blood  -EGD on 6/17 showed normal esophagus, normal gastric body, erythematous mucosa in the antrum, erythematous duodenopathy and normal second portion of duodenum and no active signs of recent bleeding or any melena or coffee-ground emesis and biopsies were taken from stomach body and is pending  -Colonoscopy was done on 6/18/2023 which showed hematin(altered blood/coffee-ground-like material) was found in the entire colon, terminal ileum appeared normal, scattered small mouth diverticula were found in the sigmoid colon and internal hemorrhoids were found during retroflexion and there was small and GI recommended capsule endoscopy for small bowel evaluation  -His diet was advanced to low fiber diet by GI and was switched to Protonix 40 mg daily  -Hemoglobin has been monitored and was 7 today and he is asymptomatic and we will give him a unit of blood  -Seen by GI and CTA ordered and patient agrees with the same  -We will check hemoglobin every 8 hours  - repeat hb after one unit today is 8   - spoke with dr saleem and pt hb  has come up and he has no active bleeding and will cancerl CTA abdomen for now and monitor and if active bleed it can be pursued     Transfusion associated reaction  -Got a unit of blood and after that he was found to have the chills and initially his temperature was mildly elevated but then later it was 100.6  -Patient did not had any oozing from the IV site  -I spoke with the nursing staff and also with the blood bank and transfusion associated labs will be drawn and I have also ordered haptoglobin, LDH, bilirubin  -I evaluated the patient and his wife Samantha was present and patient did had temperature of 102.6, no shortness of breath he is on room air, blood pressure is stable and he did urinate and his urine was not pink, no itching or any urticaria symptoms and no angioedema  -We will continue to monitor him closely and he is on telemetry and we will do every vitals for now  -Monitor intake and output  -Tylenol for fever  - will monitor closely and move him to [facility]     Recent right quadrant tendon repair in May 2023 by [facility]  -Had been on 325 mg daily ASA until a couple days ago (DVT proph) and recently decreased to 81 mg daily.    Also had been taking 3 ibuprofen most days and some days six for 2+ weeks.    -Patient was started on Tylenol and p.o. narcotics for pain  -We have held his aspirin and NSAIDs  -Patient and wife they had completed 325 at home and patient is walking but will also add SCDs on one of the legs    HLD  -Not on any meds     Atypical Chest discomfort likely due to acute anemia  -Patient had atypical chest discomfort and his high sensitive troponin were elevated to 32 and they have trended down to 23  -EKG on 6/16 showed normal sinus rhythm  -echo done during this admission showed EF of 60 to 65%, normal left ventricular wall motion, RV systolic pressure is normal  - if Continues to have chest discomfort can consider stress test as outpatient once anemia is improved         Diet: Low Fiber  "Diet    DVT Prophylaxis: Pneumatic Compression Devices  Mills Catheter: Not present  Lines: None     Cardiac Monitoring: ACTIVE order. Indication: Tachyarrhythmias, acute (48 hours)  Code Status: Full Code      Clinically Significant Risk Factors                         # Overweight: Estimated body mass index is 28.12 kg/m  as calculated from the following:    Height as of 5/23/23: 1.905 m (6' 3\").    Weight as of this encounter: 102.1 kg (225 lb)., PRESENT ON ADMISSION          Disposition Plan      Expected Discharge Date: 06/20/2023        Discharge Comments: If stable      Clarita Dumas MD  Hospitalist Service  [facility]  Securely message with [employer] (more info)  Text page via [employer] Paging/Directory   ______________________________________________________________________    Interval History     Seen today and no episode of melena and he is asymptomatic but hemoglobin again this morning was 7 and unit of blood was ordered.  Denies any nausea, vomiting, chest discomfort.  Patient does mention that he gets tachycardic when he walks.    Discussed plan of care with the patient and the nurse and blood transfusion was being given.  Patient is aware of the plan for CTA.    Seen again at 5 pm and no sob, blood pressure is stable   Temp still high. Did call blood bank to speak with on call     Physical Exam   Vital Signs: Temp: 98.4  F (36.9  C) Temp src: Oral BP: 121/71 Pulse: 84   Resp: 16 SpO2: 97 % O2 Device: None (Room air)    Weight: 225 lbs 0 oz        General: Patient appears comfortable and in no acute distress.  HEENT: Head is atraumatic, normocephalic.  Pupils are equal, round and reactive to light.  No scleral icterus. Oral mucosa is moist   Neck: Neck is supple  Respiratory: Lungs are clear to auscultation bilaterally with no wheeze or crackles   Cardiovascular: Regular rate , S1 and S2 normal with no murmer or rubs or gallops  Abdomen:   soft , non tender , non distended and bowel " sound present   Skin: No skin rashes   Neurologic: No facial droop  Musculoskeletal: Normal Range of motion over upper and lower extremities bilaterally   Psychiatric: cooperative     Medical Decision Making              Time Spent in care of patient is 50 minutes and I reviewed his labs, imaging, reviewed note from GI team, ordered unit of blood and discussed with patient and nurse in detail    Data     I have personally reviewed the following data over the past 24 hrs:    N/A  \   7.0 (L)   / N/A     N/A N/A N/A /  N/A   N/A N/A N/A \       Imaging results reviewed over the past 24 hrs:   No results found for this or any previous visit (from the past 24 hour(s)).

## 2023-06-19 NOTE — PROVIDER NOTIFICATION
MD Notification    Notified Person: MD    Notified Person Name: Dr. Dumas     Notification Date/Time:6/19/2023 1401    Notification Interaction: Vocera    Purpose of Notification:Pt with possible blood reaction     Orders Received: No new orders at this time, offer pt tylenol and MD will come to assess pt within the hour    Comments:

## 2023-06-19 NOTE — PROGRESS NOTES
Patient called due to chills/rigors after finishing 1 unit of PRBCs. Infusion stopped. Tmax of 99.4F , /79, , O2 100% RA. No SOB, no pain. MD notified. Awaiting orders. Protocol followed. Updated primary RN.

## 2023-06-19 NOTE — PLAN OF CARE
Date/Time: 3867-3749, 06/18/23    Trauma/Ortho/Medical (Choose one) : Medical    Diagnosis:Anemia, possible GI bleed  POD#:1 from EGD, and 0 from colonoscopy  Mental Status:A&Ox4 very pleasant  Activity/dangle: Independent in room w/ crutches.  Diet:Low fiber  Pain:Denies  Mills/Voiding: Continent B/B  Tele/Restraints/Iso: Tele: NSR, tachycardia w/ movement  02/LDA:VSS on RA  D/C Date:Pending improvement  Other Info: Continue to monitor. Hgb 7. GI recommending capsule endoscopy.

## 2023-06-20 LAB
ANION GAP SERPL CALCULATED.3IONS-SCNC: 8 MMOL/L (ref 7–15)
BUN SERPL-MCNC: 14.3 MG/DL (ref 8–23)
CALCIUM SERPL-MCNC: 8.4 MG/DL (ref 8.8–10.2)
CHLORIDE SERPL-SCNC: 106 MMOL/L (ref 98–107)
CREAT SERPL-MCNC: 1.1 MG/DL (ref 0.67–1.17)
DEPRECATED HCO3 PLAS-SCNC: 24 MMOL/L (ref 22–29)
ERYTHROCYTE [DISTWIDTH] IN BLOOD BY AUTOMATED COUNT: 18.4 % (ref 10–15)
FERRITIN SERPL-MCNC: 71 NG/ML (ref 31–409)
FLUAV RNA SPEC QL NAA+PROBE: NEGATIVE
FLUBV RNA RESP QL NAA+PROBE: NEGATIVE
GFR SERPL CREATININE-BSD FRML MDRD: 73 ML/MIN/1.73M2
GLUCOSE SERPL-MCNC: 107 MG/DL (ref 70–99)
HAPTOGLOB SERPL-MCNC: 135 MG/DL (ref 32–197)
HCT VFR BLD AUTO: 23.2 % (ref 40–53)
HGB BLD-MCNC: 7.5 G/DL (ref 13.3–17.7)
HGB BLD-MCNC: 7.6 G/DL (ref 13.3–17.7)
HGB BLD-MCNC: 7.6 G/DL (ref 13.3–17.7)
HGB BLD-MCNC: 8.2 G/DL (ref 13.3–17.7)
MCH RBC QN AUTO: 30 PG (ref 26.5–33)
MCHC RBC AUTO-ENTMCNC: 32.8 G/DL (ref 31.5–36.5)
MCV RBC AUTO: 92 FL (ref 78–100)
PLATELET # BLD AUTO: 153 10E3/UL (ref 150–450)
POTASSIUM SERPL-SCNC: 3.8 MMOL/L (ref 3.4–5.3)
RBC # BLD AUTO: 2.53 10E6/UL (ref 4.4–5.9)
RSV RNA SPEC NAA+PROBE: NEGATIVE
SARS-COV-2 RNA RESP QL NAA+PROBE: NEGATIVE
SODIUM SERPL-SCNC: 138 MMOL/L (ref 136–145)
WBC # BLD AUTO: 7 10E3/UL (ref 4–11)

## 2023-06-20 PROCEDURE — 36415 COLL VENOUS BLD VENIPUNCTURE: CPT | Performed by: HOSPITALIST

## 2023-06-20 PROCEDURE — 250N000013 HC RX MED GY IP 250 OP 250 PS 637: Performed by: HOSPITALIST

## 2023-06-20 PROCEDURE — 82310 ASSAY OF CALCIUM: CPT | Performed by: HOSPITALIST

## 2023-06-20 PROCEDURE — 85027 COMPLETE CBC AUTOMATED: CPT | Performed by: HOSPITALIST

## 2023-06-20 PROCEDURE — 120N000013 HC R&B IMCU

## 2023-06-20 PROCEDURE — 87637 SARSCOV2&INF A&B&RSV AMP PRB: CPT | Performed by: HOSPITALIST

## 2023-06-20 PROCEDURE — 120N000001 HC R&B MED SURG/OB

## 2023-06-20 PROCEDURE — 85018 HEMOGLOBIN: CPT | Performed by: HOSPITALIST

## 2023-06-20 PROCEDURE — 99232 SBSQ HOSP IP/OBS MODERATE 35: CPT | Performed by: HOSPITALIST

## 2023-06-20 RX ADMIN — PANTOPRAZOLE SODIUM 40 MG: 40 TABLET, DELAYED RELEASE ORAL at 17:26

## 2023-06-20 RX ADMIN — PANTOPRAZOLE SODIUM 40 MG: 40 TABLET, DELAYED RELEASE ORAL at 07:09

## 2023-06-20 ASSESSMENT — ACTIVITIES OF DAILY LIVING (ADL)
ADLS_ACUITY_SCORE: 20

## 2023-06-20 NOTE — PLAN OF CARE
Goal Outcome Evaluation:       A&O x's 4. VSS on R/A.  Lungs sounds - Clear. Bowel Sounds - normoactive. Urine Output - adequate, continent, one BM black in color during shift. Incisions - knee from previous surgery, SILVANA. Ambulation - independent, needs to keep moving due to previous knee surgery. Diet - Regular then clear liquids at 8 AM 6/21.  Pain controlled by - denies pain. Pt will discharge tomorrow and then have GI capsule study as OP on Thursday.

## 2023-06-20 NOTE — PROGRESS NOTES
[facility]  Gastroenterology Progress Note     Brandon Dye MRN# [id number]   YOB: 1955 Age: 67 year old          Assessment and Plan:   Brandon Dye is a67 year old male [facility] having increased fatigue, dyspnea, lightheadedness, and vague chest discomfort with melena and anemia    Symptomatic anemia  GI bleed  6/17 EGD unremarkable  6/17 Colonoscopy noted no source of blood loss and noted melenotic stool in terminal ileum  Hemoglobin stable- ok to discharge with no CTA  LIkely has small bowel bleed    -- outpatient capsule endoscopy on 6/22 with [facility]  -- Serial hemoglobin and transfuse if 7 or less  -- continue PPI BID                  Interval History:   no new complaints, doing well and doing well; no cp, sob, n/v/d, or abd pain.              Review of Systems:   C: NEGATIVE for fever, chills, change in weight  E/M: NEGATIVE for ear, mouth and throat problems  R: NEGATIVE for significant cough or SOB  CV: NEGATIVE for chest pain, palpitations or peripheral edema             Medications:   I have reviewed this patient's current medications    iopamidol ([id number])  135 mL Intravenous Once     pantoprazole  40 mg Oral BID AC     sodium chloride 0.9 %  85 mL Intravenous Once     sodium chloride (PF)  3 mL Intracatheter Q8H                  Physical Exam:   Vitals were reviewed  Vital Signs with Ranges  Temp:  [97.8  F (36.6  C)-102.9  F (39.4  C)] 98.4  F (36.9  C)  Pulse:  [] 87  Resp:  [14-21] 16  BP: ()/(53-83) 95/59  SpO2:  [95 %-100 %] 98 %  I/O last 3 completed shifts:  In: 740 [P.O.:740]  Out: 150 [Urine:150]  Constitutional: healthy, alert and no distress   Cardiovascular: negative, PMI normal. No lifts, heaves, or thrills. RRR. No murmurs, clicks gallops or rub  Respiratory: negative, Percussion normal. Good diaphragmatic excursion. Lungs clear  Abdomen: Abdomen soft, non-tender. BS normal. No masses,  organomegaly           Data:   I reviewed the patient's new clinical lab test results.   Recent Labs   Lab Test 06/20/23  1420 06/20/23  0533 06/19/23  2149 06/18/23  0632 06/17/23  2253 06/17/23  1411 06/17/23  0721 06/16/23  2122 06/16/23  1234   WBC  --  7.0  --   --  7.8  --  6.2  --  7.5   HGB 8.2* 7.6*  7.6* 7.8*   < > 7.5*   < > 7.1*  7.1*   < > 7.3*   MCV  --  92  --   --  93  --  97  --  93   PLT  --  153  --   --  157  --  151  --  198   INR  --   --   --   --   --   --   --   --  1.10    < > = values in this interval not displayed.     Recent Labs   Lab Test 06/20/23  0533 06/17/23  0721 06/16/23  1234   POTASSIUM 3.8 3.9 3.8   CHLORIDE 106 108* 103   CO2 24 21* 24   BUN 14.3 20.9 26.7*   ANIONGAP 8 10 9     Recent Labs   Lab Test 06/19/23  1528   BILITOTAL 0.5       I reviewed the patient's new imaging results.    All laboratory data reviewed  All imaging studies reviewed by me.    Teetee Frias PA-C,  6/19/2023  [facility]  Office : [phone]  Cell: [phone] (Dr. Hearn)  Cell: [phone] (Teetee Frias PA-C)

## 2023-06-20 NOTE — PLAN OF CARE
"Goal Outcome Evaluation:No Change  A and O x4. Up to bathroom with SBA of 1 and crutches. Voided yellow urine. VSS. T 99.9 down to 99.3 overnight. Denies pain. Intermittently c/o chills, stated it \"feels like when you have the flu\"                        "

## 2023-06-20 NOTE — PROGRESS NOTES
[facility]    Medicine Progress Note - Hospitalist Service    Date of Admission:  6/16/2023    Assessment & Plan     This is a 67-year-old male who presented to the ED on 6/16 with a chief complaint of increasing fatigue, dyspnea, lightheadedness and weak chest discomfort and was also noted to have dark stools.  Patient has been taking significant amount of NSAIDs for right quadrant tendon repair.  His hemoglobin on presentation was 7.3 and of note his hemoglobin on 5/23/2023 was stable at 14.3.  Patient's aspirin and NSAIDs were held and GI was consulted    Acute GI bleed  Acute blood loss anemia due to above  Iron deficiency  On admission presented with symptomatic anemia and was found to have hemoglobin of 7.3-and his hemoglobin recently on 5/23/2023 was stable at 14.3.  -His aspirin and NSAIDs were held  -GI was consulted and he was started on Protonix drip and was given 2 units of blood on admission  -EGD on 6/17 showed normal esophagus, normal gastric body, erythematous mucosa in the antrum, erythematous duodenopathy and normal second portion of duodenum and no active signs of recent bleeding or any melena or coffee-ground emesis and biopsies were taken from stomach body and is pending  -Colonoscopy was done on 6/18/2023 which showed hematin(altered blood/coffee-ground-like material) was found in the entire colon, terminal ileum appeared normal, scattered small mouth diverticula were found in the sigmoid colon and internal hemorrhoids were found during retroflexion and there was small and GI recommended capsule endoscopy for small bowel evaluation  -His diet was advanced to low fiber diet by GI and was switched to Protonix 40 mg daily  -Bili was being monitored and was 7 on 6/19 and the initial plan was to do CTA but he got 1 unit of blood and developed transfusion reaction and as he had no active bleed it was discussed with GI team and CT abdomen was discontinued and he was monitored  very closely for transfusion associated reaction  -Repeat hemoglobin this morning is 7.6  -Iron labs were done and his ferritin is in process but his total iron is low at 37 and iron binding capacity is low at 228 and iron saturation index is 16  And cussed with patient about risk and benefits of IV Venofer versus oral iron and he wants to wait   -We will continue to monitor hemoglobin every 8-12 hours continue with Protonix  - repeat hb this late noon is 8.2 and spoke with dr saleem and he will be scheduled for capsule on Thursday   - if hb stable by am then discharge in am   -regular diet today and clear in am   - spoke with pt and wife in late noon and they are aware of plan       Transfusion associated reaction on 6/19  -Developed transfusion associated reaction in form of fever and chills and had no flank pain, no pink urine and LDH was normal and blood cultures done on 6/19 have been negative so far and transfusion reaction labs have also been ordered and are pending    Recent right quadrant tendon repair in May 2023 by [facility]  -Had been on 325 mg daily ASA until a couple days ago (DVT proph) and recently decreased to 81 mg daily.    Also had been taking 3 ibuprofen most days and some days six for 2+ weeks.    -Patient was started on Tylenol and p.o. narcotics for pain  -We have held his aspirin and NSAIDs  -Patient and wife they had completed 325 at home and patient Is courage to walk    HLD  -Not on any meds     Atypical Chest discomfort likely due to acute anemia  -Patient had atypical chest discomfort and his high sensitive troponin were elevated to 32 and they have trended down to 23  -EKG on 6/16 showed normal sinus rhythm  -echo done during this admission showed EF of 60 to 65%, normal left ventricular wall motion, RV systolic pressure is normal  - if Continues to have chest discomfort can consider stress test as outpatient once anemia is improved         Diet: Low Fiber Diet    DVT Prophylaxis: Pneumatic  "Compression Devices  Mills Catheter: Not present  Lines: None     Cardiac Monitoring: ACTIVE order. Indication: Tachyarrhythmias, acute (48 hours)  Code Status: Full Code      Clinically Significant Risk Factors                         # Overweight: Estimated body mass index is 28.12 kg/m  as calculated from the following:    Height as of 5/23/23: 1.905 m (6' 3\").    Weight as of this encounter: 102.1 kg (225 lb)., PRESENT ON ADMISSION          Disposition Plan      Expected Discharge Date: 06/21/2023        Discharge Comments: If stable      Clarita Dumas MD  Hospitalist Service  [facility]  Securely message with [employer] (more info)  Text page via [employer] Paging/Directory   ______________________________________________________________________    Interval History     Seen today and his fever resolved after yesterday evening.  No fever no chills, no rigors, no flank pain, no pink urine and overnight did complain of feeling as if he had the flu pressure this morning was 98.2.  I did order influenza, COVID-19 and RSV given his flulike symptoms.  [place] [facility] [place]  He did not had any bowel movement and did not had any melena, hematemesis or hematochezia    Plan of care was discussed with the patient, nurse and wife and the GI team    Physical Exam   Vital Signs: Temp: 98.2  F (36.8  C) Temp src: Oral BP: 103/53 Pulse: 69   Resp: 16 SpO2: 96 % O2 Device: None (Room air)    Weight: 225 lbs 0 oz        General: Patient appears comfortable and in no acute distress.  Respiratory: Lungs are clear to auscultation bilaterally with no wheeze or crackles   Cardiovascular: Regular rate , S1 and S2 normal with no murmer or rubs or gallops  Abdomen:   soft , non tender , non distended and bowel sound present   Skin: No skin rashes   Neurologic: No facial droop  Musculoskeletal: Normal Range of motion over upper and lower extremities bilaterally   Psychiatric: cooperative     Medical Decision Making "                  Data     I have personally reviewed the following data over the past 24 hrs:    7.0  \   7.6 (L); 7.6 (L)   / 153     138 106 14.3 /  107 (H)   3.8 24 1.10 \       ALT: N/A AST: N/A AP: N/A TBILI: 0.5   ALB: N/A TOT PROTEIN: N/A LIPASE: N/A       Ferritin:  N/A % Retic:  N/A LDH:  173       Imaging results reviewed over the past 24 hrs:   No results found for this or any previous visit (from the past 24 hour(s)).

## 2023-06-20 NOTE — PROGRESS NOTES
VSS. A/O. Ind. Denies pain or SOB. +bm/black once. Voiding fine, no blood in urine. Had a blood transfusion (temp/chill/rigors) reaction after the blood was completed. Temp spiked max to 102.8, HTN. Blood product sent down to lab. MD aware. Lab drawn. Tele SR, tachy in low 100s with activity. txf to [facility] for close monitoring.

## 2023-06-21 VITALS
OXYGEN SATURATION: 96 % | BODY MASS INDEX: 28.12 KG/M2 | DIASTOLIC BLOOD PRESSURE: 73 MMHG | HEART RATE: 84 BPM | WEIGHT: 225 LBS | SYSTOLIC BLOOD PRESSURE: 109 MMHG | TEMPERATURE: 97.9 F | RESPIRATION RATE: 18 BRPM

## 2023-06-21 LAB
BKR LAB AP TR RXN INTERPRETATION: NORMAL
BKR LAB AP TRAN RXN RECOMMENDATION: NORMAL
BKR LAB AP TRANS SIGNS AND SX: NORMAL
BKR LAB AP TRANSFUSION REACTION: NORMAL
HGB BLD-MCNC: 7.7 G/DL (ref 13.3–17.7)
PATH REPORT.ADDENDUM SPEC: NORMAL
PATH REPORT.COMMENTS IMP SPEC: NORMAL
PATH REPORT.FINAL DX SPEC: NORMAL
PATH REPORT.GROSS SPEC: NORMAL
PATH REPORT.MICROSCOPIC SPEC OTHER STN: NORMAL
PATH REPORT.RELEVANT HX SPEC: NORMAL
PHOTO IMAGE: NORMAL

## 2023-06-21 PROCEDURE — 36415 COLL VENOUS BLD VENIPUNCTURE: CPT | Performed by: HOSPITALIST

## 2023-06-21 PROCEDURE — 85018 HEMOGLOBIN: CPT | Performed by: HOSPITALIST

## 2023-06-21 PROCEDURE — 250N000013 HC RX MED GY IP 250 OP 250 PS 637: Performed by: HOSPITALIST

## 2023-06-21 PROCEDURE — 99239 HOSP IP/OBS DSCHRG MGMT >30: CPT | Performed by: HOSPITALIST

## 2023-06-21 RX ORDER — PANTOPRAZOLE SODIUM 40 MG/1
40 TABLET, DELAYED RELEASE ORAL
Qty: 60 TABLET | Refills: 1 | Status: SHIPPED | OUTPATIENT
Start: 2023-06-21 | End: 2023-06-27

## 2023-06-21 RX ORDER — FERROUS SULFATE 325(65) MG
325 TABLET ORAL
Qty: 30 TABLET | Refills: 0 | Status: SHIPPED | OUTPATIENT
Start: 2023-06-23

## 2023-06-21 RX ADMIN — PANTOPRAZOLE SODIUM 40 MG: 40 TABLET, DELAYED RELEASE ORAL at 06:55

## 2023-06-21 ASSESSMENT — ACTIVITIES OF DAILY LIVING (ADL)
ADLS_ACUITY_SCORE: 20

## 2023-06-21 NOTE — PLAN OF CARE
Goal Outcome Evaluation:       A&O x's 4. VSS on R/A.  Lungs sounds - Clear. Bowel Sounds - normoactive, two BM's during shift.. Urine Output - adequate, continent. Incisions - knee from previous surgery, SILVANA. Ambulation - independent, needs to keep moving due to previous knee surgery. Diet - Clear liquids. Pain controlled by - denies pain. Pt discharging today. Reviewed AVS and obtained signature. GI capsule study as OP tomorrow

## 2023-06-21 NOTE — PROGRESS NOTES
A&O x's 4. VSS on R/A.  Lungs sounds - Clear. Bowel Sounds - normoactive. Urine Output - adequate, continent, no BM during shift. Incisions - knee from previous surgery, SILVANA. Ambulation - independent w/crutches. Diet - clear liquids at 8 AM 6/21.  Pain controlled by - denies pain. Pt will discharge today and then have GI capsule study as OP on Thursday.

## 2023-06-21 NOTE — DISCHARGE SUMMARY
"[facility]  Hospitalist Discharge Summary      Date of Admission:  6/16/2023  Date of Discharge:  6/21/2023 12:44 PM  Discharging Provider: Clarita Dumas MD  Discharge Service: Hospitalist Service    Discharge Diagnoses     Acute blood loss anemia due to GI bleed  Iron deficiency anemia  Febrile nonhemolytic transfusion reaction  Recent right quadrant tendon repair in May 2023  Mildly elevated troponin likely due to acute anemia  Atypical chest discomfort-resolved    Clinically Significant Risk Factors     # Overweight: Estimated body mass index is 28.12 kg/m  as calculated from the following:    Height as of 5/23/23: 1.905 m (6' 3\").    Weight as of this encounter: 102.1 kg (225 lb).       Follow-ups Needed After Discharge   Follow-up Appointments     Follow-up and recommended labs and tests       Follow up with primary care provider, Nikunj Verduzco, within 2- 3 days for   hospital follow- up.  The following labs/tests are recommended: cbc and   basic .    Follow with Dr Hearn on 6/22/23 for capsule endoscopy        {Additional follow-up instructions/to-do's for PCP        Unresulted Labs Ordered in the Past 30 Days of this Admission     Date and Time Order Name Status Description    6/19/2023  9:35 PM Transfusion Reaction Extended In process     6/19/2023  2:51 PM Transfusion Reaction Culture and Stain Preliminary     6/19/2023  2:18 PM Blood Culture Peripheral Blood Preliminary       These results will be followed up by    Discharge Disposition   Discharged to home  Condition at discharge: Stable    Hospital Course     This is a very pleasant 67-year-old male who recently had right quad tendon repair in late May 2023 at TCU and was on 325 mg of aspirin for DVT prophylaxis and presented to the hospital on 6/16 and had recently decreased to 81 mg daily and has been taking 3 tablets of ibuprofen most of the days and some days will take 6 tablets presented to the hospital with a chief " complaint of increasing fatigue, dizziness, lightheadedness and with chest discomfort and was having dark stools.    He had hemoglobin done on 5/23/2023 which showed that the hemoglobin was stable at 14.3 and his aspirin and NSAIDs were held and GI was consulted and he was started on Protonix drip and the patient on admission received 2 units of blood.      He had EGD on 6/17 showed normal esophagus, normal gastric body, erythematous mucosa in the antrum, erythematous duodenopathy and normal second portion of duodenum and no active signs of recent bleeding or any melena or coffee-ground emesis and biopsies were taken from stomach body and pathology shows mild superficial chronic inactive gastritis, negative for intestinal metaplasia, gastric epithelial dysplasia or malignancy and H. pylori stain is pending    He also had Colonoscopy was done on 6/18/2023 which showed hematin(altered blood/coffee-ground-like material) was found in the entire colon, terminal ileum appeared normal, scattered small mouth diverticula were found in the sigmoid colon and internal hemorrhoids were found during retroflexion and there was small and GI recommended capsule endoscopy for small bowel evaluation.  Patient was also started on Protonix twice daily.    Patient was being monitored and his hemoglobin did drop to 7 and he was given another unit of blood on 6/19 and developed fever and chills and had transfusion reactions and was characterized as febrile nonhemolytic transfusion reaction on investigation and blood cultures and transfusion reaction culture and stain have been negative.  Patient's fever resolved.  He also had iron labs done during the admission and his total iron is low and will do iron placement at home.    On day of discharge his hemoglobin was stable around 7.7 and he does have an appointment with Dr. Hearn on 6/22/2023 for capsule endoscopy and patient is aware of the same.  He was also instructed that in case he  starts throwing blood or has blood in the stool or pass out then he should call 911 and go to the nearest ER and he understood and verbalized back.    As far as his chest discomfort was concerned that he had initial high sensitive troponin elevated at 32 and did trend down to 23 with blood replacement and EKG showed normal sinus rhythm and a EF of 60 to 65% with normal LV wall motion and RV systolic pressure was normal and this is most likely due to acute anemia and in case he continues to have chest discomfort once anemia is improving and he should contact his primary care physician and stress test can be considered as outpatient.  Patient was in agreement with the same.    Patient will also follow-up with a primary care physician and will have repeat hemoglobin checked.  On day of discharge patient and wife were in agreement with him going back home        Consultations This Hospital Stay   GASTROENTEROLOGY IP CONSULT    Code Status   Full Code    Time Spent on this Encounter   I, Clarita Dumas MD, personally saw the patient today and spent greater than 30 minutes discharging this patient.       Clarita Dumas MD  [facility]  [address]  [place] [address]  Phone: [phone]  ______________________________________________________________________    Physical Exam   Vital Signs: Temp: 97.9  F (36.6  C) Temp src: Oral BP: 109/73 Pulse: 84   Resp: 18 SpO2: 96 % O2 Device: None (Room air)    Weight: 225 lbs 0 oz        General: Patient appears comfortable and in no acute distress.  Respiratory: Lungs are clear to auscultation bilaterally with no wheeze or crackles   Cardiovascular: Regular rate , S1 and S2 normal with no murmer or rubs or gallops  Abdomen:   soft , non tender , non distended and bowel sound present   Skin: No skin rashes  Neurologic:  No facial droop  Musculoskeletal: Normal Range of motion over upper and lower extremities bilaterally   Psychiatric: cooperative         Primary Care Physician   Baldomero Savage    Discharge Orders      Reason for your hospital stay    Gi bleed     Follow-up and recommended labs and tests     Follow up with primary care provider, Nikunj Verduzco, within 2- 3 days for hospital follow- up.  The following labs/tests are recommended: cbc and basic .    Follow with Dr Hearn on 23 for capsule endoscopy     Activity    Your activity upon discharge: activity as tolerated     Diet    Follow this diet upon discharge: Orders Placed This Encounter      Clear Liquid Diet       Significant Results and Procedures   Most Recent 3 CBC's:Recent Labs   Lab Test 23  0607 23  2237 23  1420 23  0533 23  0632 23  2253 23  1411 23  0721   WBC  --   --   --  7.0  --  7.8  --  6.2   HGB 7.7* 7.5* 8.2* 7.6*  7.6*   < > 7.5*   < > 7.1*  7.1*   MCV  --   --   --  92  --  93  --  97   PLT  --   --   --  153  --  157  --  151    < > = values in this interval not displayed.     Most Recent 3 BMP's:Recent Labs   Lab Test 23  0533 23  0721 23  1234    139 136   POTASSIUM 3.8 3.9 3.8   CHLORIDE 106 108* 103   CO2 24 21* 24   BUN 14.3 20.9 26.7*   CR 1.10 0.91 0.95   ANIONGAP 8 10 9   JI 8.4* 8.1* 8.8   * 92 133*     Most Recent 2 LFT's:Recent Labs   Lab Test 23  1528   BILITOTAL 0.5   ,   Results for orders placed or performed during the hospital encounter of 23   Echocardiogram Complete     Value    LVEF  60-65%    Narrative    [id number]  [id number]  [id number]  [id number]^ONEIL^SEGUNDO^PRASHANT     [facility]  Echocardiography Laboratory  [address] [place] [id number]     Name: TATE JAIMES  MRN: [id number]  : 1955  Study Date: 2023 12:06 PM  Age: 67 yrs  Gender: Male  Patient Location: [facility]  Reason For Study: Palpitations, Chest Discomfort  Ordering Physician: SEGUNDO RIGGS  Performed By: GIDEON     BSA: 2.3 m2  Height: 75 in  Weight: 224 lb  HR:  74  BP: 132/78 mmHg  ______________________________________________________________________________  Procedure  Complete Portable Echo Adult.  ______________________________________________________________________________  Interpretation Summary     The visual ejection fraction is 60-65%.  Normal left ventricular wall motion  Right ventricular systolic pressure is normal.  There is no comparison study available.  ______________________________________________________________________________  Left Ventricle  The left ventricle is normal in size. There is normal left ventricular wall  thickness. Left ventricular systolic function is normal. The visual ejection  fraction is 60-65%. Normal left ventricular wall motion. No evidence of left  ventricular mass or tumors.     Right Ventricle  The right ventricle is normal in structure, function and size.     Atria  Normal left atrial size. Right atrial size is normal.     Mitral Valve  Thickened mitral valve anterior leaflet.     Tricuspid Valve  Normal tricuspid valve. There is trace tricuspid regurgitation. Right  ventricular systolic pressure is normal.     Aortic Valve  There is trivial trileaflet aortic sclerosis. There is trace aortic  regurgitation.     Pulmonic Valve  Normal pulmonic valve.     Vessels  The aortic root is normal size.     Pericardium  The pericardium appears normal.     Rhythm  Sinus rhythm was noted.  ______________________________________________________________________________  MMode/2D Measurements & Calculations     IVSd: 1.1 cm  LVIDd: 4.4 cm  LVIDs: 2.6 cm  LVPWd: 1.2 cm  FS: 40.5 %  LV mass(C)d: 180.0 grams  LV mass(C)dI: 78.2 grams/m2  Ao root diam: 3.6 cm  LA dimension: 4.6 cm  asc Aorta Diam: 3.2 cm  LA/Ao: 1.3  LA Volume (BP): 56.3 ml  LA Volume Index (BP): 24.5 ml/m2     RA Area: 15.3 cm2  RWT: 0.55  TAPSE: 2.2 cm     Doppler Measurements & Calculations  MV E max iman: 86.5 cm/sec  MV A max iman: 70.2 cm/sec  MV E/A: 1.2  MV dec time: 0.24  sec  PA acc time: 0.11 sec  TR max lenin: 254.5 cm/sec  TR max P.9 mmHg  E/E' avg: 10.1     Lateral E/e': 10.1  Medial E/e': 10.1  RV S Lenin: 23.4 cm/sec     ______________________________________________________________________________  Report approved by: Sita Mirza 2023 01:38 PM               Discharge Medications   Discharge Medication List as of 2023 12:10 PM      START taking these medications    Details   ferrous sulfate (FEROSUL) 325 (65 Fe) MG tablet Take 1 tablet (325 mg) by mouth daily (with breakfast), Disp-30 tablet, R-0, E-Prescribe      pantoprazole (PROTONIX) 40 MG EC tablet Take 1 tablet (40 mg) by mouth 2 times daily (before meals), Disp-60 tablet, R-1, E-Prescribe         CONTINUE these medications which have NOT CHANGED    Details   acetaminophen (TYLENOL) 325 MG tablet Take 325-650 mg by mouth every 6 hours as needed for mild pain, Historical         STOP taking these medications       aspirin 81 MG EC tablet Comments:   Reason for Stopping:         ibuprofen (ADVIL/MOTRIN) 200 MG tablet Comments:   Reason for Stopping:             Allergies   No Known Allergies

## 2023-06-22 ENCOUNTER — PATIENT OUTREACH (OUTPATIENT)
Dept: FAMILY MEDICINE | Facility: CLINIC | Age: 68
End: 2023-06-22
Payer: COMMERCIAL

## 2023-06-22 NOTE — TELEPHONE ENCOUNTER
"Hospital/TCU/ED for chronic condition Discharge Protocol    \"Hi, my name is Augusto Zuñiga RN, a registered nurse, and I am calling from [facility].  I am calling to follow up and see how things are going for you after your recent emergency visit/hospital/TCU stay.\"    Tell me how you are doing now that you are home?\" feeling good      Discharge Instructions    \"Let's review your discharge instructions.  What is/are the follow-up recommendations?  Pt. Response: Patients     \"Has an appointment with your primary care provider been scheduled?\"   Yes. (confirm)    \"When you see the provider, I would recommend that you bring your medications with you.\"    Medications    \"Tell me what changed about your medicines when you discharged?\"    Changes to chronic meds?    0-1    \"What questions do you have about your medications?\"    None     New diagnoses of heart failure, COPD, diabetes, or MI?    No              Post Discharge Medication Reconciliation Status: discharge medications reconciled, continue medications without change.    Was MTM referral placed (*Make sure to put transitions as reason for referral)?   No    Call Summary    \"What questions or concerns do you have about your recent visit and your follow-up care?\"     none    \"If you have questions or things don't continue to improve, we encourage you contact us through the main clinic number (give number).  Even if the clinic is not open, triage nurses are available 24/7 to help you.     We would like you to know that our clinic has extended hours (provide information).  We also have urgent care (provide details on closest location and hours/contact info)\"      \"Thank you for your time and take care!\"             "

## 2023-06-22 NOTE — TELEPHONE ENCOUNTER
What type of discharge? Inpatient  Risk of Hospital admission or ED visit: 11%  Is a TCM episode required? Yes  When should the patient follow up with PCP? within 30 days of discharge.    Holly Hall RN  [facility]

## 2023-06-24 LAB — BACTERIA BLD CULT: NO GROWTH

## 2023-06-27 ENCOUNTER — OFFICE VISIT (OUTPATIENT)
Dept: FAMILY MEDICINE | Facility: CLINIC | Age: 68
End: 2023-06-27
Payer: COMMERCIAL

## 2023-06-27 VITALS
SYSTOLIC BLOOD PRESSURE: 123 MMHG | DIASTOLIC BLOOD PRESSURE: 80 MMHG | OXYGEN SATURATION: 99 % | BODY MASS INDEX: 28 KG/M2 | HEART RATE: 75 BPM | TEMPERATURE: 98.2 F | WEIGHT: 224 LBS

## 2023-06-27 DIAGNOSIS — Z09 HOSPITAL DISCHARGE FOLLOW-UP: Primary | ICD-10-CM

## 2023-06-27 DIAGNOSIS — K92.2 UPPER GI BLEED: ICD-10-CM

## 2023-06-27 LAB
BASOPHILS # BLD AUTO: 0 10E3/UL (ref 0–0.2)
BASOPHILS NFR BLD AUTO: 1 %
EOSINOPHIL # BLD AUTO: 0.4 10E3/UL (ref 0–0.7)
EOSINOPHIL NFR BLD AUTO: 6 %
ERYTHROCYTE [DISTWIDTH] IN BLOOD BY AUTOMATED COUNT: 17.4 % (ref 10–15)
HCT VFR BLD AUTO: 30 % (ref 40–53)
HGB BLD-MCNC: 9.5 G/DL (ref 13.3–17.7)
IMM GRANULOCYTES # BLD: 0 10E3/UL
IMM GRANULOCYTES NFR BLD: 0 %
LYMPHOCYTES # BLD AUTO: 2.8 10E3/UL (ref 0.8–5.3)
LYMPHOCYTES NFR BLD AUTO: 42 %
MCH RBC QN AUTO: 29.6 PG (ref 26.5–33)
MCHC RBC AUTO-ENTMCNC: 31.7 G/DL (ref 31.5–36.5)
MCV RBC AUTO: 94 FL (ref 78–100)
MONOCYTES # BLD AUTO: 0.4 10E3/UL (ref 0–1.3)
MONOCYTES NFR BLD AUTO: 6 %
NEUTROPHILS # BLD AUTO: 3 10E3/UL (ref 1.6–8.3)
NEUTROPHILS NFR BLD AUTO: 45 %
PLATELET # BLD AUTO: 286 10E3/UL (ref 150–450)
RBC # BLD AUTO: 3.21 10E6/UL (ref 4.4–5.9)
WBC # BLD AUTO: 6.5 10E3/UL (ref 4–11)

## 2023-06-27 PROCEDURE — 83550 IRON BINDING TEST: CPT | Performed by: PHYSICIAN ASSISTANT

## 2023-06-27 PROCEDURE — 83540 ASSAY OF IRON: CPT | Performed by: PHYSICIAN ASSISTANT

## 2023-06-27 PROCEDURE — 99495 TRANSJ CARE MGMT MOD F2F 14D: CPT | Performed by: PHYSICIAN ASSISTANT

## 2023-06-27 PROCEDURE — 80053 COMPREHEN METABOLIC PANEL: CPT | Performed by: PHYSICIAN ASSISTANT

## 2023-06-27 PROCEDURE — 36415 COLL VENOUS BLD VENIPUNCTURE: CPT | Performed by: PHYSICIAN ASSISTANT

## 2023-06-27 PROCEDURE — 85025 COMPLETE CBC W/AUTO DIFF WBC: CPT | Performed by: PHYSICIAN ASSISTANT

## 2023-06-27 NOTE — PROGRESS NOTES
"  Assessment & Plan     Hospital discharge follow-up  Upper GI bleed    Repeat hemoglobin, might be a little too early for iron studies but will re-check today. Stay well hydrated. Avoid NSAIDs, tylenol as needed. Reviewed iron supplementation and provided recommendations. Plan to follow-up in 1-2 months. Sooner if needed. Discussed signs and symptoms that warrant urgent/emergent eval. Happy with plan.     - CBC with platelets and differential  - Comprehensive metabolic panel (BMP + Alb, Alk Phos, ALT, AST, Total. Bili, TP)  - Iron and iron binding capacity    30 minutes spent by me on the date of the encounter doing chart review, review of test results, interpretation of tests, patient visit and documentation      MED REC REQUIRED  Post Medication Reconciliation Status: discharge medications reconciled and changed, per note/orders  BMI:   Estimated body mass index is 28 kg/m  as calculated from the following:    Height as of 5/23/23: 1.905 m (6' 3\").    Weight as of this encounter: 101.6 kg (224 lb).   Weight management plan: Discussed healthy diet and exercise guidelines    The likelihood of other entities in the differential is insufficient to justify any further testing for them at this time. This was explained to the patient. The patient was advised that persistent or worsening symptoms would require further evaluation. Patient advised to call the office and if unable to reach to go to the emergency room if they develop any new or worsening symptoms. Expressed understanding and agreement with above stated plan.    ARIELLE Bedolla [facility] [place] is a very pleasant 67 year old, presenting for the following health issues:  Hospital F/U    Here today with his wife for hospital discharge follow-up. See hospital summary below. Recovering well. Tolerating iron. Appetite improved as well as his energy and skin color. Stool has returned to normal color. Needs repeat blood " counts. Off NSAIDs. Chest discomfort noted in hospital has improved. Starting PT this week for quadriceps tendon rupture. In knee brace, compression wrap. Using crutches.     Retired  - does [employer]. Lives in .     Hospital Follow-up Visit:    Hospital/Nursing Home/IP Rehab Facility: [facility]  Date of Admission: 6/16/23  Date of Discharge: 6/21/23  Reason(s) for Admission: Symptomatic anemia    Was your hospitalization related to COVID-19? No   Problems taking medications regularly:  None  Medication changes since discharge: Started on Iron  Problems adhering to non-medication therapy:  None    Summary of hospitalization:  [facility] discharge summary reviewed  Diagnostic Tests/Treatments reviewed.  Follow up needed: GI, Ortho/PT.   Other Healthcare Providers Involved in Patient s Care:         Physical Therapy  Update since discharge: improved.         Plan of care communicated with patient           Hospital Course  This is a very pleasant 67-year-old male who recently had right quad tendon repair in late May 2023 at TCU and was on 325 mg of aspirin for DVT prophylaxis and presented to the hospital on 6/16 and had recently decreased to 81 mg daily and has been taking 3 tablets of ibuprofen most of the days and some days will take 6 tablets presented to the hospital with a chief complaint of increasing fatigue, dizziness, lightheadedness and with chest discomfort and was having dark stools.     He had hemoglobin done on 5/23/2023 which showed that the hemoglobin was stable at 14.3 and his aspirin and NSAIDs were held and GI was consulted and he was started on Protonix drip and the patient on admission received 2 units of blood.       He had EGD on 6/17 showed normal esophagus, normal gastric body, erythematous mucosa in the antrum, erythematous duodenopathy and normal second portion of duodenum and no active signs of recent bleeding or any melena or  coffee-ground emesis and biopsies were taken from stomach body and pathology shows mild superficial chronic inactive gastritis, negative for intestinal metaplasia, gastric epithelial dysplasia or malignancy and H. pylori stain is pending     He also had Colonoscopy was done on 6/18/2023 which showed hematin(altered blood/coffee-ground-like material) was found in the entire colon, terminal ileum appeared normal, scattered small mouth diverticula were found in the sigmoid colon and internal hemorrhoids were found during retroflexion and there was small and GI recommended capsule endoscopy for small bowel evaluation.  Patient was also started on Protonix twice daily.     Patient was being monitored and his hemoglobin did drop to 7 and he was given another unit of blood on 6/19 and developed fever and chills and had transfusion reactions and was characterized as febrile nonhemolytic transfusion reaction on investigation and blood cultures and transfusion reaction culture and stain have been negative.  Patient's fever resolved.  He also had iron labs done during the admission and his total iron is low and will do iron placement at home.     On day of discharge his hemoglobin was stable around 7.7 and he does have an appointment with Dr. Hearn on 6/22/2023 for capsule endoscopy and patient is aware of the same.  He was also instructed that in case he starts throwing blood or has blood in the stool or pass out then he should call 911 and go to the nearest ER and he understood and verbalized back.     As far as his chest discomfort was concerned that he had initial high sensitive troponin elevated at 32 and did trend down to 23 with blood replacement and EKG showed normal sinus rhythm and a EF of 60 to 65% with normal LV wall motion and RV systolic pressure was normal and this is most likely due to acute anemia and in case he continues to have chest discomfort once anemia is improving and he should contact his primary care  physician and stress test can be considered as outpatient.  Patient was in agreement with the same.     Patient will also follow-up with a primary care physician and will have repeat hemoglobin checked.  On day of discharge patient and wife were in agreement with him going back home      Review of Systems   Constitutional, HEENT, cardiovascular, pulmonary, GI, , musculoskeletal, neuro, skin, endocrine and psych systems are negative, except as otherwise noted.      Objective    /80 (BP Location: Right arm, Patient Position: Sitting, Cuff Size: Adult Large)   Pulse 75   Temp 98.2  F (36.8  C) (Oral)   Wt 101.6 kg (224 lb)   SpO2 99%   BMI 28.00 kg/m    Body mass index is 28 kg/m .     No Known Allergies  Current Outpatient Medications   Medication Sig Dispense Refill     acetaminophen (TYLENOL) 325 MG tablet Take 325-650 mg by mouth every 6 hours as needed for mild pain       ferrous sulfate (FEROSUL) 325 (65 Fe) MG tablet Take 1 tablet (325 mg) by mouth daily (with breakfast) 30 tablet 0     No past medical history on file.  Past Surgical History:   Procedure Laterality Date     COLONOSCOPY N/A 6/18/2023    Procedure: Colonoscopy;  Surgeon: Cheyenne Braswell MD;  Location:  GI     ESOPHAGOSCOPY, GASTROSCOPY, DUODENOSCOPY (EGD), COMBINED N/A 6/17/2023    Procedure: Esophagoscopy, gastroscopy, duodenoscopy (EGD), combined;  Surgeon: Cheyenne Braswell MD;  Location:  GI       Physical Exam   GENERAL: healthy, alert and no distress  EYES: Eyes grossly normal to inspection, PERRL and conjunctivae and sclerae normal  NECK: no adenopathy, no asymmetry, masses, or scars and thyroid normal to palpation  RESP: lungs clear to auscultation - no rales, rhonchi or wheezes  CV: regular rate and rhythm, normal S1 S2, no S3 or S4, no murmur, click or rub, no peripheral edema  ABDOMEN: soft, nontender, no hepatosplenomegaly, no masses  MS: Right thigh/knee in compression wrapping + knee brace. No distal swelling.    SKIN: no suspicious lesions or rashes  NEURO: Normal strength and tone, mentation intact and speech normal  PSYCH: mentation appears normal, affect normal/bright

## 2023-06-28 LAB
ALBUMIN SERPL BCG-MCNC: 4.1 G/DL (ref 3.5–5.2)
ALP SERPL-CCNC: 98 U/L (ref 40–129)
ALT SERPL W P-5'-P-CCNC: 30 U/L (ref 0–70)
ANION GAP SERPL CALCULATED.3IONS-SCNC: 10 MMOL/L (ref 7–15)
AST SERPL W P-5'-P-CCNC: 23 U/L (ref 0–45)
BILIRUB SERPL-MCNC: 0.2 MG/DL
BUN SERPL-MCNC: 16.4 MG/DL (ref 8–23)
CALCIUM SERPL-MCNC: 9.1 MG/DL (ref 8.8–10.2)
CHLORIDE SERPL-SCNC: 105 MMOL/L (ref 98–107)
CREAT SERPL-MCNC: 1 MG/DL (ref 0.67–1.17)
DEPRECATED HCO3 PLAS-SCNC: 25 MMOL/L (ref 22–29)
GFR SERPL CREATININE-BSD FRML MDRD: 82 ML/MIN/1.73M2
GLUCOSE SERPL-MCNC: 94 MG/DL (ref 70–99)
IRON BINDING CAPACITY (ROCHE): 269 UG/DL (ref 240–430)
IRON SATN MFR SERPL: 12 % (ref 15–46)
IRON SERPL-MCNC: 33 UG/DL (ref 61–157)
POTASSIUM SERPL-SCNC: 4.1 MMOL/L (ref 3.4–5.3)
PROT SERPL-MCNC: 6.3 G/DL (ref 6.4–8.3)
SODIUM SERPL-SCNC: 140 MMOL/L (ref 136–145)

## 2023-06-28 NOTE — RESULT ENCOUNTER NOTE
"Gulshan Taylor,     It was great meeting you and your wife yesterday!     Hemoglobin went up from 7.7  to 9.5 which is what we want to see! Iron levels more or less the same. I would do \"Slow Fe\" every other day with Vitamin C. Let's re-check in 1-2 months. I will send you a MyChart notification to setup a follow-up appointment where we can see how you are doing and repeat these labs.     Let me know if you have any questions or concerns,     Baldomero Savage PA-C  [facility]  "

## 2023-07-03 LAB
BACTERIA BPU CULT: NO GROWTH
GRAM STAIN RESULT: NORMAL

## 2023-07-05 ENCOUNTER — ANCILLARY PROCEDURE (OUTPATIENT)
Dept: GENERAL RADIOLOGY | Facility: CLINIC | Age: 68
End: 2023-07-05
Attending: INTERNAL MEDICINE
Payer: COMMERCIAL

## 2023-07-05 DIAGNOSIS — Z04.9 ENCOUNTER FOR EXAMINATION AND OBSERVATION FOR UNSPECIFIED REASON: ICD-10-CM

## 2023-07-05 PROCEDURE — 74018 RADEX ABDOMEN 1 VIEW: CPT | Mod: TC | Performed by: RADIOLOGY

## 2023-08-20 ENCOUNTER — HEALTH MAINTENANCE LETTER (OUTPATIENT)
Age: 68
End: 2023-08-20

## 2024-06-17 PROBLEM — Z76.89 HEALTH CARE HOME: Status: RESOLVED | Noted: 2023-05-23 | Resolved: 2024-06-17

## 2024-10-13 ENCOUNTER — HEALTH MAINTENANCE LETTER (OUTPATIENT)
Age: 69
End: 2024-10-13

## 2025-01-09 ENCOUNTER — MYC MEDICAL ADVICE (OUTPATIENT)
Dept: FAMILY MEDICINE | Facility: CLINIC | Age: 70
End: 2025-01-09
Payer: COMMERCIAL

## 2025-01-10 ENCOUNTER — ANCILLARY PROCEDURE (OUTPATIENT)
Dept: GENERAL RADIOLOGY | Facility: CLINIC | Age: 70
End: 2025-01-10
Attending: PHYSICIAN ASSISTANT
Payer: COMMERCIAL

## 2025-01-10 PROCEDURE — 71046 X-RAY EXAM CHEST 2 VIEWS: CPT | Mod: TC | Performed by: STUDENT IN AN ORGANIZED HEALTH CARE EDUCATION/TRAINING PROGRAM

## 2025-02-11 ENCOUNTER — OFFICE VISIT (OUTPATIENT)
Dept: URGENT CARE | Facility: URGENT CARE | Age: 70
End: 2025-02-11
Payer: COMMERCIAL

## 2025-02-11 ENCOUNTER — ANCILLARY PROCEDURE (OUTPATIENT)
Dept: GENERAL RADIOLOGY | Facility: CLINIC | Age: 70
End: 2025-02-11
Attending: NURSE PRACTITIONER
Payer: COMMERCIAL

## 2025-02-11 VITALS
TEMPERATURE: 97.4 F | BODY MASS INDEX: 28.95 KG/M2 | WEIGHT: 228 LBS | HEART RATE: 86 BPM | SYSTOLIC BLOOD PRESSURE: 137 MMHG | OXYGEN SATURATION: 97 % | DIASTOLIC BLOOD PRESSURE: 89 MMHG | RESPIRATION RATE: 16 BRPM

## 2025-02-11 DIAGNOSIS — R05.1 ACUTE COUGH: Primary | ICD-10-CM

## 2025-02-11 DIAGNOSIS — R05.1 ACUTE COUGH: ICD-10-CM

## 2025-02-11 PROCEDURE — 71046 X-RAY EXAM CHEST 2 VIEWS: CPT | Mod: TC | Performed by: RADIOLOGY

## 2025-02-11 PROCEDURE — 99204 OFFICE O/P NEW MOD 45 MIN: CPT | Performed by: NURSE PRACTITIONER

## 2025-02-11 RX ORDER — BENZONATATE 200 MG/1
200 CAPSULE ORAL 3 TIMES DAILY PRN
Qty: 30 CAPSULE | Refills: 0 | Status: SHIPPED | OUTPATIENT
Start: 2025-02-11

## 2025-02-11 RX ORDER — ALBUTEROL SULFATE 90 UG/1
2 INHALANT RESPIRATORY (INHALATION) EVERY 6 HOURS PRN
Qty: 18 G | Refills: 0 | Status: SHIPPED | OUTPATIENT
Start: 2025-02-11

## 2025-02-11 ASSESSMENT — ENCOUNTER SYMPTOMS
FEVER: 0
COUGH: 1
WHEEZING: 0
SORE THROAT: 0
FATIGUE: 0

## 2025-02-11 NOTE — PROGRESS NOTES
Assessment & Plan       ICD-10-CM    1. Acute cough  R05.1 XR Chest 2 Views     benzonatate (TESSALON) 200 MG capsule     albuterol (PROAIR HFA/PROVENTIL HFA/VENTOLIN HFA) 108 (90 Base) MCG/ACT inhaler           Patient instructions:  Take medication as needed. Drink plenty of fluids. You can also use OTC mucinex as needed for your symptoms. Viral coughs can last for a long time, up to 1 month is normal. However, if your symptoms worsen please go to the ED for further evaluation.     Medial decision making: Brandon Dye is a 69 year old male who presents to the clinic today for evaluation of cough.    Differential Diagnosis for cough includes viral URI including influenza type illness, bronchitis, pneumonia, CHF, GERD, or reactive airway disease.  Symptoms are not consistent with diagnosis of pulmonary emboli. CXR ordered, preliminary read completed by this provider is negative for pneumonia. Awaiting final read by radiologist. This is most likely a viral URI and will resolve on it's own. Pt given Rx for tessalon pearls to use as needed for cough. If cough worsens, develops fever, shortness of breath or chest pain, instructed to go to the ED.       No follow-ups on file.    At the end of the encounter, I discussed results, diagnosis, medications. Discussed red flags for immediate return to clinic/ER, as well as indications for follow up if no improvement. Patient understood and agreed to plan. Patient was stable for discharge.    Subjective     Brandon is a 69 year old male who presents to clinic today the following health issues:  Chief Complaint   Patient presents with    Urgent Care     Pt present with cough, chest congestion, onset 5 days.      Pt reports that he was in [place] about 10 days ago and exposed to [place] tide. [place], which [place]. 5 days ago started with cough and congestion that has not improved. Denies fever or fatigue.             Review of Systems    Constitutional:  Negative for fatigue and fever.   HENT:  Positive for congestion and postnasal drip. Negative for sore throat.    Respiratory:  Positive for cough. Negative for wheezing.        Problem List:  2023-06: Upper GI bleed  2023-06: Symptomatic anemia  2023-05: Health Care Home  2023-05: ACP (advance care planning)  2007-05: Patellar tendinitis      No past medical history on file.    Social History     Tobacco Use    Smoking status: Never     Passive exposure: Never    Smokeless tobacco: Never   Substance Use Topics    Alcohol use: Yes     Alcohol/week: 1.0 standard drink of alcohol     Types: 1 Standard drinks or equivalent per week           Objective    /89   Pulse 86   Temp 97.4  F (36.3  C) (Tympanic)   Resp 16   Wt 103.4 kg (228 lb)   SpO2 97%   BMI 28.95 kg/m    Physical Exam  Constitutional:       General: He is not in acute distress.     Appearance: Normal appearance. He is not ill-appearing, toxic-appearing or diaphoretic.   HENT:      Head: Normocephalic and atraumatic.      Right Ear: Tympanic membrane, ear canal and external ear normal.      Left Ear: Tympanic membrane, ear canal and external ear normal.      Mouth/Throat:      Mouth: Mucous membranes are moist.      Pharynx: No oropharyngeal exudate or posterior oropharyngeal erythema.   Cardiovascular:      Rate and Rhythm: Normal rate and regular rhythm.   Pulmonary:      Effort: Pulmonary effort is normal.      Breath sounds: Examination of the right-lower field reveals decreased breath sounds. Examination of the left-lower field reveals decreased breath sounds. Decreased breath sounds present.   Neurological:      Mental Status: He is alert.              SHERRILL ALVARADO CNP

## 2025-04-11 ENCOUNTER — MYC MEDICAL ADVICE (OUTPATIENT)
Dept: FAMILY MEDICINE | Facility: CLINIC | Age: 70
End: 2025-04-11
Payer: COMMERCIAL

## 2025-04-11 DIAGNOSIS — R07.89 LEFT-SIDED CHEST WALL PAIN: Primary | ICD-10-CM

## 2025-04-15 NOTE — TELEPHONE ENCOUNTER
Agree, we should investigate further.    In reviewing his chart I see he had some chest pain during his admission in 2023 and outpatient stress testing was advised.  I don't see this was ever done.     I'm going to order a stress test first to ensure this is not heart related.    Please give him the information for scheduling.    Thanks

## 2025-04-28 ENCOUNTER — HOSPITAL ENCOUNTER (OUTPATIENT)
Dept: CARDIOLOGY | Facility: CLINIC | Age: 70
Discharge: HOME | End: 2025-04-28
Attending: PHYSICIAN ASSISTANT | Admitting: PHYSICIAN ASSISTANT
Payer: COMMERCIAL

## 2025-04-28 DIAGNOSIS — R07.89 LEFT-SIDED CHEST WALL PAIN: ICD-10-CM

## 2025-04-28 PROCEDURE — 93018 CV STRESS TEST I&R ONLY: CPT | Performed by: INTERNAL MEDICINE

## 2025-04-28 PROCEDURE — 93325 DOPPLER ECHO COLOR FLOW MAPG: CPT | Mod: TC

## 2025-04-28 PROCEDURE — 93016 CV STRESS TEST SUPVJ ONLY: CPT | Performed by: INTERNAL MEDICINE

## 2025-04-28 PROCEDURE — 93350 STRESS TTE ONLY: CPT | Mod: 26 | Performed by: INTERNAL MEDICINE

## 2025-04-28 PROCEDURE — 93321 DOPPLER ECHO F-UP/LMTD STD: CPT | Mod: 26 | Performed by: INTERNAL MEDICINE

## 2025-04-28 PROCEDURE — 93325 DOPPLER ECHO COLOR FLOW MAPG: CPT | Mod: 26 | Performed by: INTERNAL MEDICINE

## 2025-04-28 PROCEDURE — 255N000002 HC RX 255 OP 636: Performed by: PHYSICIAN ASSISTANT

## 2025-04-28 RX ADMIN — HUMAN ALBUMIN MICROSPHERES AND PERFLUTREN 9 ML: 10; .22 INJECTION, SOLUTION INTRAVENOUS at 09:27

## 2025-04-29 ENCOUNTER — TELEPHONE (OUTPATIENT)
Dept: FAMILY MEDICINE | Facility: CLINIC | Age: 70
End: 2025-04-29
Payer: COMMERCIAL

## 2025-04-29 DIAGNOSIS — R07.89 LEFT-SIDED CHEST WALL PAIN: Primary | ICD-10-CM

## 2025-04-29 NOTE — TELEPHONE ENCOUNTER
Spoke with pt about provider's note. Pt verbalized understanding and chest pain is still there.    Pt described it as not heart related but when he touches far L-side and moving around the L chest area - not bone related per pt.    Pt mention maybe can be related to in Nov when he had an increase in GI gas - stomach bloating with certain foods.    Pt guessing it might be gas trapped in upper ribs potentially?     Asking on the next step on recommendations. Please advise, thank you    Dre Mishra RN  [facility]

## 2025-04-29 NOTE — TELEPHONE ENCOUNTER
Pt states he already got a chest xray in Jan and Feb and a CT was discussed as the next step. As he did echo and stress test yesterday.    Please review and advise.    Rebecca Roberts RN

## 2025-04-29 NOTE — TELEPHONE ENCOUNTER
----- Message from Mariela Bunn sent at 4/29/2025  8:40 AM CDT -----  Team - please call patient with results.  His stress echo was normal  How is his chest pain?    Thanks

## 2025-04-29 NOTE — TELEPHONE ENCOUNTER
Thanks for the update  Let's do a CXR  I've placed an order in his chart  Could you please help him schedule?      thanks

## 2025-04-29 NOTE — RESULT ENCOUNTER NOTE
Team - please call patient with results.  His stress echo was normal  How is his chest pain?    Thanks

## 2025-05-10 ENCOUNTER — HOSPITAL ENCOUNTER (OUTPATIENT)
Dept: CT IMAGING | Facility: CLINIC | Age: 70
Discharge: HOME | End: 2025-05-10
Attending: PHYSICIAN ASSISTANT | Admitting: PHYSICIAN ASSISTANT
Payer: COMMERCIAL

## 2025-05-10 DIAGNOSIS — R07.89 LEFT-SIDED CHEST WALL PAIN: ICD-10-CM

## 2025-05-10 LAB
CREAT BLD-MCNC: 1 MG/DL (ref 0.7–1.2)
EGFRCR SERPLBLD CKD-EPI 2021: >60 ML/MIN/1.73M2

## 2025-05-10 PROCEDURE — 250N000011 HC RX IP 250 OP 636: Performed by: PHYSICIAN ASSISTANT

## 2025-05-10 PROCEDURE — 71260 CT THORAX DX C+: CPT

## 2025-05-10 PROCEDURE — 82565 ASSAY OF CREATININE: CPT

## 2025-05-10 PROCEDURE — 250N000009 HC RX 250: Performed by: PHYSICIAN ASSISTANT

## 2025-05-10 RX ORDER — IOPAMIDOL 755 MG/ML
114 INJECTION, SOLUTION INTRAVASCULAR ONCE
Status: COMPLETED | OUTPATIENT
Start: 2025-05-10 | End: 2025-05-10

## 2025-05-10 RX ADMIN — IOPAMIDOL 114 ML: 755 INJECTION, SOLUTION INTRAVENOUS at 11:19

## 2025-05-10 RX ADMIN — SODIUM CHLORIDE 75 ML: 9 INJECTION, SOLUTION INTRAVENOUS at 11:19

## 2025-05-12 ENCOUNTER — RESULTS FOLLOW-UP (OUTPATIENT)
Dept: FAMILY MEDICINE | Facility: CLINIC | Age: 70
End: 2025-05-12

## 2025-05-12 NOTE — RESULT ENCOUNTER NOTE
Ciaran Taylor,     Here are my comments about the recent results: your CT scan is negative for any acute findings.  There is nothing to explain your pain.  You  do have some very roxana nodules in your lungs.  Have you ever been a smoker?  If so, we should repeat a CT scan in one year.  If not, no further follow-up is required.     Please let us know if you have any questions or concerns.    If you have a question about the results, please use the ? (question edith) option at the upper right corner.     Regards,  Mariela Bunn PA-C

## (undated) RX ORDER — FENTANYL CITRATE 50 UG/ML
INJECTION, SOLUTION INTRAMUSCULAR; INTRAVENOUS
Status: DISPENSED
Start: 2023-06-17

## (undated) RX ORDER — FENTANYL CITRATE 50 UG/ML
INJECTION, SOLUTION INTRAMUSCULAR; INTRAVENOUS
Status: DISPENSED
Start: 2023-06-18